# Patient Record
Sex: MALE | Race: WHITE | NOT HISPANIC OR LATINO | URBAN - METROPOLITAN AREA
[De-identification: names, ages, dates, MRNs, and addresses within clinical notes are randomized per-mention and may not be internally consistent; named-entity substitution may affect disease eponyms.]

---

## 2017-01-04 ENCOUNTER — OFFICE (AMBULATORY)
Dept: URBAN - METROPOLITAN AREA CLINIC 64 | Facility: CLINIC | Age: 67
End: 2017-01-04
Payer: MEDICARE

## 2017-01-04 VITALS
SYSTOLIC BLOOD PRESSURE: 139 MMHG | HEIGHT: 74 IN | DIASTOLIC BLOOD PRESSURE: 72 MMHG | HEART RATE: 80 BPM | WEIGHT: 276 LBS

## 2017-01-04 DIAGNOSIS — K21.9 GASTRO-ESOPHAGEAL REFLUX DISEASE WITHOUT ESOPHAGITIS: ICD-10-CM

## 2017-01-04 DIAGNOSIS — R13.10 DYSPHAGIA, UNSPECIFIED: ICD-10-CM

## 2017-01-04 PROCEDURE — 99213 OFFICE O/P EST LOW 20 MIN: CPT

## 2017-01-24 ENCOUNTER — ON CAMPUS - OUTPATIENT (AMBULATORY)
Dept: URBAN - METROPOLITAN AREA HOSPITAL 2 | Facility: HOSPITAL | Age: 67
End: 2017-01-24
Payer: MEDICARE

## 2017-01-24 VITALS
HEIGHT: 74 IN | DIASTOLIC BLOOD PRESSURE: 83 MMHG | RESPIRATION RATE: 17 BRPM | RESPIRATION RATE: 18 BRPM | SYSTOLIC BLOOD PRESSURE: 143 MMHG | SYSTOLIC BLOOD PRESSURE: 136 MMHG | HEART RATE: 85 BPM | DIASTOLIC BLOOD PRESSURE: 72 MMHG | SYSTOLIC BLOOD PRESSURE: 145 MMHG | HEART RATE: 68 BPM | HEART RATE: 78 BPM | WEIGHT: 260 LBS | OXYGEN SATURATION: 99 % | OXYGEN SATURATION: 97 % | RESPIRATION RATE: 16 BRPM | SYSTOLIC BLOOD PRESSURE: 133 MMHG | HEART RATE: 74 BPM | TEMPERATURE: 97.4 F | DIASTOLIC BLOOD PRESSURE: 82 MMHG | HEART RATE: 79 BPM | SYSTOLIC BLOOD PRESSURE: 138 MMHG | DIASTOLIC BLOOD PRESSURE: 103 MMHG

## 2017-01-24 DIAGNOSIS — K44.9 DIAPHRAGMATIC HERNIA WITHOUT OBSTRUCTION OR GANGRENE: ICD-10-CM

## 2017-01-24 DIAGNOSIS — K22.2 ESOPHAGEAL OBSTRUCTION: ICD-10-CM

## 2017-01-24 DIAGNOSIS — R13.10 DYSPHAGIA, UNSPECIFIED: ICD-10-CM

## 2017-01-24 PROCEDURE — 43249 ESOPH EGD DILATION <30 MM: CPT

## 2017-01-24 RX ADMIN — PROPOFOL: 10 INJECTION, EMULSION INTRAVENOUS at 09:01

## 2019-12-04 RX ORDER — AMLODIPINE BESYLATE 5 MG/1
TABLET ORAL
Qty: 180 TABLET | Refills: 0 | Status: SHIPPED | OUTPATIENT
Start: 2019-12-04 | End: 2020-03-06

## 2020-03-06 RX ORDER — AMLODIPINE BESYLATE 5 MG/1
TABLET ORAL
Qty: 180 TABLET | Refills: 0 | Status: SHIPPED | OUTPATIENT
Start: 2020-03-06 | End: 2020-06-01

## 2020-06-01 RX ORDER — AMLODIPINE BESYLATE 5 MG/1
TABLET ORAL
Qty: 180 TABLET | Refills: 0 | Status: SHIPPED | OUTPATIENT
Start: 2020-06-01 | End: 2020-08-31 | Stop reason: SDUPTHER

## 2020-08-31 RX ORDER — AMLODIPINE BESYLATE 5 MG/1
TABLET ORAL
Qty: 180 TABLET | Refills: 0 | Status: SHIPPED | OUTPATIENT
Start: 2020-08-31 | End: 2020-10-07

## 2020-09-06 ENCOUNTER — ANESTHESIA EVENT (OUTPATIENT)
Dept: PERIOP | Facility: HOSPITAL | Age: 70
End: 2020-09-06

## 2020-09-06 ENCOUNTER — HOSPITAL ENCOUNTER (INPATIENT)
Facility: HOSPITAL | Age: 70
LOS: 3 days | Discharge: HOME | End: 2020-09-09
Attending: EMERGENCY MEDICINE | Admitting: FAMILY MEDICINE

## 2020-09-06 ENCOUNTER — ANESTHESIA (OUTPATIENT)
Dept: PERIOP | Facility: HOSPITAL | Age: 70
End: 2020-09-06

## 2020-09-06 ENCOUNTER — APPOINTMENT (OUTPATIENT)
Dept: CT IMAGING | Facility: HOSPITAL | Age: 70
End: 2020-09-06

## 2020-09-06 DIAGNOSIS — I47.1 SVT (SUPRAVENTRICULAR TACHYCARDIA) (HCC): ICD-10-CM

## 2020-09-06 DIAGNOSIS — K43.9 VENTRAL HERNIA WITHOUT OBSTRUCTION OR GANGRENE: Primary | ICD-10-CM

## 2020-09-06 PROBLEM — K43.6 VENTRAL HERNIA WITH OBSTRUCTION AND WITHOUT GANGRENE: Status: ACTIVE | Noted: 2020-09-06

## 2020-09-06 LAB
ANION GAP SERPL CALCULATED.3IONS-SCNC: 11 MMOL/L (ref 5–15)
BASOPHILS # BLD AUTO: 0.1 10*3/MM3 (ref 0–0.2)
BASOPHILS NFR BLD AUTO: 0.6 % (ref 0–1.5)
BUN SERPL-MCNC: 26 MG/DL (ref 8–23)
BUN SERPL-MCNC: ABNORMAL MG/DL
BUN/CREAT SERPL: ABNORMAL
CALCIUM SPEC-SCNC: 9.1 MG/DL (ref 8.6–10.5)
CHLORIDE SERPL-SCNC: 105 MMOL/L (ref 98–107)
CO2 SERPL-SCNC: 25 MMOL/L (ref 22–29)
CREAT SERPL-MCNC: 1.16 MG/DL (ref 0.76–1.27)
DEPRECATED RDW RBC AUTO: 42 FL (ref 37–54)
EOSINOPHIL # BLD AUTO: 0 10*3/MM3 (ref 0–0.4)
EOSINOPHIL NFR BLD AUTO: 0.1 % (ref 0.3–6.2)
ERYTHROCYTE [DISTWIDTH] IN BLOOD BY AUTOMATED COUNT: 13 % (ref 12.3–15.4)
GFR SERPL CREATININE-BSD FRML MDRD: 62 ML/MIN/1.73
GLUCOSE SERPL-MCNC: 156 MG/DL (ref 65–99)
HBA1C MFR BLD: 5.3 % (ref 3.5–5.6)
HCT VFR BLD AUTO: 45.2 % (ref 37.5–51)
HGB BLD-MCNC: 15.8 G/DL (ref 13–17.7)
INR PPP: 0.94 (ref 0.93–1.1)
LYMPHOCYTES # BLD AUTO: 0.6 10*3/MM3 (ref 0.7–3.1)
LYMPHOCYTES NFR BLD AUTO: 4.8 % (ref 19.6–45.3)
MCH RBC QN AUTO: 32.4 PG (ref 26.6–33)
MCHC RBC AUTO-ENTMCNC: 35 G/DL (ref 31.5–35.7)
MCV RBC AUTO: 92.3 FL (ref 79–97)
MONOCYTES # BLD AUTO: 0.4 10*3/MM3 (ref 0.1–0.9)
MONOCYTES NFR BLD AUTO: 3.5 % (ref 5–12)
NEUTROPHILS NFR BLD AUTO: 11.1 10*3/MM3 (ref 1.7–7)
NEUTROPHILS NFR BLD AUTO: 91 % (ref 42.7–76)
NRBC BLD AUTO-RTO: 0.1 /100 WBC (ref 0–0.2)
PLATELET # BLD AUTO: 320 10*3/MM3 (ref 140–450)
PMV BLD AUTO: 7.3 FL (ref 6–12)
POTASSIUM SERPL-SCNC: 4.2 MMOL/L (ref 3.5–5.2)
PROTHROMBIN TIME: 10.3 SECONDS (ref 9.6–11.7)
RBC # BLD AUTO: 4.89 10*6/MM3 (ref 4.14–5.8)
SARS-COV-2 RNA PNL SPEC NAA+PROBE: NOT DETECTED
SODIUM SERPL-SCNC: 141 MMOL/L (ref 136–145)
WBC # BLD AUTO: 12.2 10*3/MM3 (ref 3.4–10.8)

## 2020-09-06 PROCEDURE — 93005 ELECTROCARDIOGRAM TRACING: CPT | Performed by: FAMILY MEDICINE

## 2020-09-06 PROCEDURE — C1781 MESH (IMPLANTABLE): HCPCS | Performed by: SURGERY

## 2020-09-06 PROCEDURE — 25010000002 ONDANSETRON PER 1 MG: Performed by: ANESTHESIOLOGY

## 2020-09-06 PROCEDURE — 25010000002 MORPHINE PER 10 MG: Performed by: EMERGENCY MEDICINE

## 2020-09-06 PROCEDURE — 85025 COMPLETE CBC W/AUTO DIFF WBC: CPT | Performed by: EMERGENCY MEDICINE

## 2020-09-06 PROCEDURE — 74176 CT ABD & PELVIS W/O CONTRAST: CPT

## 2020-09-06 PROCEDURE — 25010000003 CEFAZOLIN PER 500 MG: Performed by: SURGERY

## 2020-09-06 PROCEDURE — 83036 HEMOGLOBIN GLYCOSYLATED A1C: CPT | Performed by: EMERGENCY MEDICINE

## 2020-09-06 PROCEDURE — 25010000002 ONDANSETRON PER 1 MG: Performed by: EMERGENCY MEDICINE

## 2020-09-06 PROCEDURE — 93005 ELECTROCARDIOGRAM TRACING: CPT | Performed by: EMERGENCY MEDICINE

## 2020-09-06 PROCEDURE — 80048 BASIC METABOLIC PNL TOTAL CA: CPT | Performed by: EMERGENCY MEDICINE

## 2020-09-06 PROCEDURE — 49568 PR IMPLANT MESH HERNIA REPAIR/DEBRIDEMENT CLOSURE: CPT | Performed by: SURGERY

## 2020-09-06 PROCEDURE — 49561 PR REPAIR INCISIONAL HERNIA,STRANG: CPT | Performed by: SURGERY

## 2020-09-06 PROCEDURE — 0WUF0JZ SUPPLEMENT ABDOMINAL WALL WITH SYNTHETIC SUBSTITUTE, OPEN APPROACH: ICD-10-PCS | Performed by: SURGERY

## 2020-09-06 PROCEDURE — 25010000002 PROPOFOL 200 MG/20ML EMULSION: Performed by: ANESTHESIOLOGY

## 2020-09-06 PROCEDURE — 99284 EMERGENCY DEPT VISIT MOD MDM: CPT

## 2020-09-06 PROCEDURE — 87635 SARS-COV-2 COVID-19 AMP PRB: CPT | Performed by: EMERGENCY MEDICINE

## 2020-09-06 PROCEDURE — 25010000002 DEXAMETHASONE PER 1 MG: Performed by: ANESTHESIOLOGY

## 2020-09-06 PROCEDURE — 99221 1ST HOSP IP/OBS SF/LOW 40: CPT | Performed by: INTERNAL MEDICINE

## 2020-09-06 PROCEDURE — 25010000002 PHENYLEPHRINE 10 MG/ML SOLUTION: Performed by: ANESTHESIOLOGY

## 2020-09-06 PROCEDURE — 25010000002 FENTANYL CITRATE (PF) 250 MCG/5ML SOLUTION: Performed by: ANESTHESIOLOGY

## 2020-09-06 PROCEDURE — 25010000002 ONDANSETRON PER 1 MG: Performed by: FAMILY MEDICINE

## 2020-09-06 PROCEDURE — 99222 1ST HOSP IP/OBS MODERATE 55: CPT | Performed by: SURGERY

## 2020-09-06 PROCEDURE — 85610 PROTHROMBIN TIME: CPT | Performed by: EMERGENCY MEDICINE

## 2020-09-06 PROCEDURE — 25010000002 MIDAZOLAM PER 1 MG: Performed by: ANESTHESIOLOGY

## 2020-09-06 PROCEDURE — 25010000002 HYDROMORPHONE PER 4 MG: Performed by: ANESTHESIOLOGY

## 2020-09-06 DEVICE — VENTRIO ST HERNIA PATCH
Type: IMPLANTABLE DEVICE | Site: ABDOMEN | Status: FUNCTIONAL
Brand: VENTRIO ST HERNIA PATCH

## 2020-09-06 RX ORDER — AMLODIPINE BESYLATE 5 MG/1
5 TABLET ORAL
Status: DISCONTINUED | OUTPATIENT
Start: 2020-09-07 | End: 2020-09-09 | Stop reason: HOSPADM

## 2020-09-06 RX ORDER — HYDROMORPHONE HCL 110MG/55ML
0.5 PATIENT CONTROLLED ANALGESIA SYRINGE INTRAVENOUS
Status: DISCONTINUED | OUTPATIENT
Start: 2020-09-06 | End: 2020-09-06 | Stop reason: HOSPADM

## 2020-09-06 RX ORDER — ONDANSETRON 2 MG/ML
INJECTION INTRAMUSCULAR; INTRAVENOUS AS NEEDED
Status: DISCONTINUED | OUTPATIENT
Start: 2020-09-06 | End: 2020-09-06 | Stop reason: SURG

## 2020-09-06 RX ORDER — FENTANYL CITRATE 50 UG/ML
INJECTION, SOLUTION INTRAMUSCULAR; INTRAVENOUS AS NEEDED
Status: DISCONTINUED | OUTPATIENT
Start: 2020-09-06 | End: 2020-09-06 | Stop reason: SURG

## 2020-09-06 RX ORDER — ONDANSETRON 2 MG/ML
4 INJECTION INTRAMUSCULAR; INTRAVENOUS ONCE
Status: COMPLETED | OUTPATIENT
Start: 2020-09-06 | End: 2020-09-06

## 2020-09-06 RX ORDER — TAMSULOSIN HYDROCHLORIDE 0.4 MG/1
1 CAPSULE ORAL 2 TIMES DAILY
COMMUNITY

## 2020-09-06 RX ORDER — MORPHINE SULFATE 4 MG/ML
2 INJECTION, SOLUTION INTRAMUSCULAR; INTRAVENOUS ONCE
Status: COMPLETED | OUTPATIENT
Start: 2020-09-06 | End: 2020-09-06

## 2020-09-06 RX ORDER — ASPIRIN 81 MG/1
81 TABLET, CHEWABLE ORAL DAILY
COMMUNITY
End: 2020-09-09 | Stop reason: HOSPADM

## 2020-09-06 RX ORDER — ACETAMINOPHEN 325 MG/1
650 TABLET ORAL EVERY 4 HOURS PRN
Status: DISCONTINUED | OUTPATIENT
Start: 2020-09-06 | End: 2020-09-09 | Stop reason: HOSPADM

## 2020-09-06 RX ORDER — POTASSIUM CHLORIDE 20 MEQ/1
40 TABLET, EXTENDED RELEASE ORAL AS NEEDED
Status: DISCONTINUED | OUTPATIENT
Start: 2020-09-06 | End: 2020-09-09 | Stop reason: HOSPADM

## 2020-09-06 RX ORDER — EPHEDRINE SULFATE 50 MG/ML
INJECTION INTRAVENOUS AS NEEDED
Status: DISCONTINUED | OUTPATIENT
Start: 2020-09-06 | End: 2020-09-06 | Stop reason: SURG

## 2020-09-06 RX ORDER — POTASSIUM CHLORIDE 7.45 MG/ML
10 INJECTION INTRAVENOUS
Status: DISCONTINUED | OUTPATIENT
Start: 2020-09-06 | End: 2020-09-09 | Stop reason: HOSPADM

## 2020-09-06 RX ORDER — LIDOCAINE HYDROCHLORIDE 20 MG/ML
INJECTION, SOLUTION EPIDURAL; INFILTRATION; INTRACAUDAL; PERINEURAL AS NEEDED
Status: DISCONTINUED | OUTPATIENT
Start: 2020-09-06 | End: 2020-09-06 | Stop reason: SURG

## 2020-09-06 RX ORDER — CHLORAL HYDRATE 500 MG
1000 CAPSULE ORAL
COMMUNITY

## 2020-09-06 RX ORDER — LOSARTAN POTASSIUM 50 MG/1
100 TABLET ORAL DAILY
Status: DISCONTINUED | OUTPATIENT
Start: 2020-09-07 | End: 2020-09-09 | Stop reason: HOSPADM

## 2020-09-06 RX ORDER — ONDANSETRON 4 MG/1
4 TABLET, FILM COATED ORAL EVERY 6 HOURS PRN
Status: DISCONTINUED | OUTPATIENT
Start: 2020-09-06 | End: 2020-09-09 | Stop reason: HOSPADM

## 2020-09-06 RX ORDER — SULINDAC 150 MG/1
150 TABLET ORAL 2 TIMES DAILY
COMMUNITY
End: 2020-09-09 | Stop reason: HOSPADM

## 2020-09-06 RX ORDER — THIAMINE HCL 100 MG
100 TABLET ORAL DAILY
Status: DISCONTINUED | OUTPATIENT
Start: 2020-09-07 | End: 2020-09-09 | Stop reason: HOSPADM

## 2020-09-06 RX ORDER — DILTIAZEM HYDROCHLORIDE 5 MG/ML
20 INJECTION INTRAVENOUS ONCE
Status: COMPLETED | OUTPATIENT
Start: 2020-09-06 | End: 2020-09-06

## 2020-09-06 RX ORDER — PHENYLEPHRINE HYDROCHLORIDE 10 MG/ML
INJECTION INTRAVENOUS AS NEEDED
Status: DISCONTINUED | OUTPATIENT
Start: 2020-09-06 | End: 2020-09-06 | Stop reason: SURG

## 2020-09-06 RX ORDER — ACETAMINOPHEN 160 MG/5ML
650 SOLUTION ORAL EVERY 4 HOURS PRN
Status: DISCONTINUED | OUTPATIENT
Start: 2020-09-06 | End: 2020-09-09 | Stop reason: HOSPADM

## 2020-09-06 RX ORDER — NEOSTIGMINE METHYLSULFATE 5 MG/5 ML
SYRINGE (ML) INTRAVENOUS AS NEEDED
Status: DISCONTINUED | OUTPATIENT
Start: 2020-09-06 | End: 2020-09-06 | Stop reason: SURG

## 2020-09-06 RX ORDER — MAGNESIUM SULFATE HEPTAHYDRATE 40 MG/ML
2 INJECTION, SOLUTION INTRAVENOUS AS NEEDED
Status: DISCONTINUED | OUTPATIENT
Start: 2020-09-06 | End: 2020-09-09 | Stop reason: HOSPADM

## 2020-09-06 RX ORDER — ACETAMINOPHEN 650 MG/1
650 SUPPOSITORY RECTAL EVERY 4 HOURS PRN
Status: DISCONTINUED | OUTPATIENT
Start: 2020-09-06 | End: 2020-09-09 | Stop reason: HOSPADM

## 2020-09-06 RX ORDER — BISACODYL 10 MG
10 SUPPOSITORY, RECTAL RECTAL DAILY PRN
Status: DISCONTINUED | OUTPATIENT
Start: 2020-09-06 | End: 2020-09-09 | Stop reason: HOSPADM

## 2020-09-06 RX ORDER — DEXAMETHASONE SODIUM PHOSPHATE 4 MG/ML
INJECTION, SOLUTION INTRA-ARTICULAR; INTRALESIONAL; INTRAMUSCULAR; INTRAVENOUS; SOFT TISSUE AS NEEDED
Status: DISCONTINUED | OUTPATIENT
Start: 2020-09-06 | End: 2020-09-06 | Stop reason: SURG

## 2020-09-06 RX ORDER — SODIUM CHLORIDE 0.9 % (FLUSH) 0.9 %
10 SYRINGE (ML) INJECTION AS NEEDED
Status: DISCONTINUED | OUTPATIENT
Start: 2020-09-06 | End: 2020-09-09 | Stop reason: HOSPADM

## 2020-09-06 RX ORDER — ONDANSETRON 2 MG/ML
4 INJECTION INTRAMUSCULAR; INTRAVENOUS ONCE AS NEEDED
Status: DISCONTINUED | OUTPATIENT
Start: 2020-09-06 | End: 2020-09-06 | Stop reason: HOSPADM

## 2020-09-06 RX ORDER — FENTANYL CITRATE 50 UG/ML
75 INJECTION, SOLUTION INTRAMUSCULAR; INTRAVENOUS
Status: DISCONTINUED | OUTPATIENT
Start: 2020-09-06 | End: 2020-09-06 | Stop reason: HOSPADM

## 2020-09-06 RX ORDER — MIDAZOLAM HYDROCHLORIDE 1 MG/ML
INJECTION INTRAMUSCULAR; INTRAVENOUS AS NEEDED
Status: DISCONTINUED | OUTPATIENT
Start: 2020-09-06 | End: 2020-09-06 | Stop reason: SURG

## 2020-09-06 RX ORDER — TAMSULOSIN HYDROCHLORIDE 0.4 MG/1
0.4 CAPSULE ORAL DAILY
Status: DISCONTINUED | OUTPATIENT
Start: 2020-09-07 | End: 2020-09-09 | Stop reason: HOSPADM

## 2020-09-06 RX ORDER — NALOXONE HCL 0.4 MG/ML
0.4 VIAL (ML) INJECTION
Status: DISCONTINUED | OUTPATIENT
Start: 2020-09-06 | End: 2020-09-09 | Stop reason: HOSPADM

## 2020-09-06 RX ORDER — ONDANSETRON 2 MG/ML
4 INJECTION INTRAMUSCULAR; INTRAVENOUS EVERY 6 HOURS PRN
Status: DISCONTINUED | OUTPATIENT
Start: 2020-09-06 | End: 2020-09-06 | Stop reason: SDUPTHER

## 2020-09-06 RX ORDER — MORPHINE SULFATE 4 MG/ML
4 INJECTION, SOLUTION INTRAMUSCULAR; INTRAVENOUS
Status: DISCONTINUED | OUTPATIENT
Start: 2020-09-06 | End: 2020-09-09 | Stop reason: HOSPADM

## 2020-09-06 RX ORDER — CALCIUM ACETATE 667 MG/1
1334 CAPSULE ORAL 2 TIMES DAILY
COMMUNITY

## 2020-09-06 RX ORDER — THIAMINE MONONITRATE (VIT B1) 100 MG
100 TABLET ORAL DAILY
COMMUNITY

## 2020-09-06 RX ORDER — CALCIUM ACETATE 667 MG/1
1334 CAPSULE ORAL 2 TIMES DAILY
Status: DISCONTINUED | OUTPATIENT
Start: 2020-09-06 | End: 2020-09-09 | Stop reason: HOSPADM

## 2020-09-06 RX ORDER — SODIUM CHLORIDE 0.9 % (FLUSH) 0.9 %
10 SYRINGE (ML) INJECTION EVERY 12 HOURS SCHEDULED
Status: DISCONTINUED | OUTPATIENT
Start: 2020-09-06 | End: 2020-09-09 | Stop reason: HOSPADM

## 2020-09-06 RX ORDER — MAGNESIUM SULFATE HEPTAHYDRATE 40 MG/ML
4 INJECTION, SOLUTION INTRAVENOUS AS NEEDED
Status: DISCONTINUED | OUTPATIENT
Start: 2020-09-06 | End: 2020-09-09 | Stop reason: HOSPADM

## 2020-09-06 RX ORDER — OMEPRAZOLE 20 MG/1
20 CAPSULE, DELAYED RELEASE ORAL DAILY
COMMUNITY

## 2020-09-06 RX ORDER — POTASSIUM CHLORIDE 1.5 G/1.77G
40 POWDER, FOR SOLUTION ORAL AS NEEDED
Status: DISCONTINUED | OUTPATIENT
Start: 2020-09-06 | End: 2020-09-09 | Stop reason: HOSPADM

## 2020-09-06 RX ORDER — ROCURONIUM BROMIDE 10 MG/ML
INJECTION, SOLUTION INTRAVENOUS AS NEEDED
Status: DISCONTINUED | OUTPATIENT
Start: 2020-09-06 | End: 2020-09-06 | Stop reason: SURG

## 2020-09-06 RX ORDER — OMEGA-3S/DHA/EPA/FISH OIL/D3 300MG-1000
800 CAPSULE ORAL DAILY
COMMUNITY

## 2020-09-06 RX ORDER — CHOLECALCIFEROL (VITAMIN D3) 125 MCG
5 CAPSULE ORAL NIGHTLY PRN
Status: DISCONTINUED | OUTPATIENT
Start: 2020-09-06 | End: 2020-09-09 | Stop reason: HOSPADM

## 2020-09-06 RX ORDER — SODIUM CHLORIDE, SODIUM LACTATE, POTASSIUM CHLORIDE, CALCIUM CHLORIDE 600; 310; 30; 20 MG/100ML; MG/100ML; MG/100ML; MG/100ML
50 INJECTION, SOLUTION INTRAVENOUS CONTINUOUS
Status: DISCONTINUED | OUTPATIENT
Start: 2020-09-06 | End: 2020-09-09 | Stop reason: HOSPADM

## 2020-09-06 RX ORDER — PANTOPRAZOLE SODIUM 40 MG/1
40 TABLET, DELAYED RELEASE ORAL EVERY MORNING
Status: DISCONTINUED | OUTPATIENT
Start: 2020-09-07 | End: 2020-09-09 | Stop reason: HOSPADM

## 2020-09-06 RX ORDER — NITROGLYCERIN 0.4 MG/1
0.4 TABLET SUBLINGUAL
Status: DISCONTINUED | OUTPATIENT
Start: 2020-09-06 | End: 2020-09-09 | Stop reason: HOSPADM

## 2020-09-06 RX ORDER — HYDROCODONE BITARTRATE AND ACETAMINOPHEN 5; 325 MG/1; MG/1
1 TABLET ORAL EVERY 4 HOURS PRN
Status: DISCONTINUED | OUTPATIENT
Start: 2020-09-06 | End: 2020-09-09 | Stop reason: HOSPADM

## 2020-09-06 RX ORDER — MAGNESIUM HYDROXIDE 1200 MG/15ML
LIQUID ORAL AS NEEDED
Status: DISCONTINUED | OUTPATIENT
Start: 2020-09-06 | End: 2020-09-06 | Stop reason: HOSPADM

## 2020-09-06 RX ORDER — LOSARTAN POTASSIUM 100 MG/1
100 TABLET ORAL DAILY
COMMUNITY
End: 2020-10-07

## 2020-09-06 RX ORDER — PROPOFOL 10 MG/ML
INJECTION, EMULSION INTRAVENOUS AS NEEDED
Status: DISCONTINUED | OUTPATIENT
Start: 2020-09-06 | End: 2020-09-06 | Stop reason: SURG

## 2020-09-06 RX ORDER — ONDANSETRON 2 MG/ML
4 INJECTION INTRAMUSCULAR; INTRAVENOUS EVERY 6 HOURS PRN
Status: DISCONTINUED | OUTPATIENT
Start: 2020-09-06 | End: 2020-09-09 | Stop reason: HOSPADM

## 2020-09-06 RX ORDER — ONDANSETRON 4 MG/1
4 TABLET, FILM COATED ORAL EVERY 6 HOURS PRN
Status: DISCONTINUED | OUTPATIENT
Start: 2020-09-06 | End: 2020-09-06 | Stop reason: SDUPTHER

## 2020-09-06 RX ORDER — LOSARTAN POTASSIUM AND HYDROCHLOROTHIAZIDE 25; 100 MG/1; MG/1
1 TABLET ORAL DAILY
COMMUNITY
End: 2020-09-06

## 2020-09-06 RX ADMIN — HYDROCODONE BITARTRATE AND ACETAMINOPHEN 1 TABLET: 5; 325 TABLET ORAL at 22:34

## 2020-09-06 RX ADMIN — CEFAZOLIN 1 G: 1 INJECTION, POWDER, FOR SOLUTION INTRAMUSCULAR; INTRAVENOUS at 22:34

## 2020-09-06 RX ADMIN — ONDANSETRON 4 MG: 2 INJECTION INTRAMUSCULAR; INTRAVENOUS at 22:40

## 2020-09-06 RX ADMIN — SODIUM CHLORIDE, SODIUM LACTATE, POTASSIUM CHLORIDE, AND CALCIUM CHLORIDE 125 ML/HR: 600; 310; 30; 20 INJECTION, SOLUTION INTRAVENOUS at 11:03

## 2020-09-06 RX ADMIN — HYDROMORPHONE HYDROCHLORIDE 0.5 MG: 2 INJECTION, SOLUTION INTRAMUSCULAR; INTRAVENOUS; SUBCUTANEOUS at 16:29

## 2020-09-06 RX ADMIN — FENTANYL CITRATE 100 MCG: 50 INJECTION, SOLUTION INTRAMUSCULAR; INTRAVENOUS at 14:29

## 2020-09-06 RX ADMIN — SODIUM CHLORIDE, SODIUM LACTATE, POTASSIUM CHLORIDE, AND CALCIUM CHLORIDE 125 ML/HR: 600; 310; 30; 20 INJECTION, SOLUTION INTRAVENOUS at 22:34

## 2020-09-06 RX ADMIN — SODIUM CHLORIDE, SODIUM LACTATE, POTASSIUM CHLORIDE, AND CALCIUM CHLORIDE 125 ML/HR: 600; 310; 30; 20 INJECTION, SOLUTION INTRAVENOUS at 16:45

## 2020-09-06 RX ADMIN — Medication 4 MG: at 15:41

## 2020-09-06 RX ADMIN — EPHEDRINE SULFATE 10 MG: 50 INJECTION, SOLUTION INTRAVENOUS at 14:35

## 2020-09-06 RX ADMIN — METOPROLOL TARTRATE 12.5 MG: 25 TABLET, FILM COATED ORAL at 21:50

## 2020-09-06 RX ADMIN — HYDROMORPHONE HYDROCHLORIDE 0.5 MG: 2 INJECTION, SOLUTION INTRAMUSCULAR; INTRAVENOUS; SUBCUTANEOUS at 16:40

## 2020-09-06 RX ADMIN — HYDROMORPHONE HYDROCHLORIDE 0.5 MG: 2 INJECTION, SOLUTION INTRAMUSCULAR; INTRAVENOUS; SUBCUTANEOUS at 16:05

## 2020-09-06 RX ADMIN — ONDANSETRON 4 MG: 2 INJECTION INTRAMUSCULAR; INTRAVENOUS at 10:16

## 2020-09-06 RX ADMIN — PROPOFOL 100 MG: 10 INJECTION, EMULSION INTRAVENOUS at 14:30

## 2020-09-06 RX ADMIN — MIDAZOLAM 2 MG: 1 INJECTION INTRAMUSCULAR; INTRAVENOUS at 14:29

## 2020-09-06 RX ADMIN — CEFAZOLIN SODIUM 2 G: 1 INJECTION, POWDER, FOR SOLUTION INTRAMUSCULAR; INTRAVENOUS at 14:29

## 2020-09-06 RX ADMIN — ONDANSETRON 4 MG: 2 INJECTION INTRAMUSCULAR; INTRAVENOUS at 15:35

## 2020-09-06 RX ADMIN — Medication 10 ML: at 21:50

## 2020-09-06 RX ADMIN — PHENYLEPHRINE HYDROCHLORIDE 0.2 MG: 10 INJECTION INTRAVENOUS at 14:33

## 2020-09-06 RX ADMIN — EPHEDRINE SULFATE 10 MG: 50 INJECTION, SOLUTION INTRAVENOUS at 15:00

## 2020-09-06 RX ADMIN — CALCIUM ACETATE 1334 MG: 667 CAPSULE ORAL at 21:50

## 2020-09-06 RX ADMIN — PHENYLEPHRINE HYDROCHLORIDE 0.2 MG: 10 INJECTION INTRAVENOUS at 15:01

## 2020-09-06 RX ADMIN — DEXAMETHASONE SODIUM PHOSPHATE 4 MG: 4 INJECTION, SOLUTION INTRAMUSCULAR; INTRAVENOUS at 14:59

## 2020-09-06 RX ADMIN — HYDROMORPHONE HYDROCHLORIDE 0.5 MG: 2 INJECTION, SOLUTION INTRAMUSCULAR; INTRAVENOUS; SUBCUTANEOUS at 16:20

## 2020-09-06 RX ADMIN — FAMOTIDINE 20 MG: 10 INJECTION, SOLUTION INTRAVENOUS at 14:27

## 2020-09-06 RX ADMIN — DILTIAZEM HYDROCHLORIDE 20 MG: 5 INJECTION INTRAVENOUS at 11:15

## 2020-09-06 RX ADMIN — ROCURONIUM BROMIDE 50 MG: 10 INJECTION, SOLUTION INTRAVENOUS at 14:29

## 2020-09-06 RX ADMIN — MORPHINE SULFATE 2 MG: 4 INJECTION INTRAVENOUS at 10:16

## 2020-09-06 RX ADMIN — PROPOFOL 200 MG: 10 INJECTION, EMULSION INTRAVENOUS at 14:29

## 2020-09-06 RX ADMIN — LIDOCAINE HYDROCHLORIDE 100 MG: 20 INJECTION, SOLUTION EPIDURAL; INFILTRATION; INTRACAUDAL; PERINEURAL at 14:29

## 2020-09-06 RX ADMIN — GLYCOPYRROLATE 0.4 MG: 0.2 INJECTION, SOLUTION INTRAMUSCULAR; INTRAVITREAL at 15:41

## 2020-09-06 NOTE — ANESTHESIA POSTPROCEDURE EVALUATION
Patient: Florencio Rangel    Procedure Summary     Date:  09/06/20 Room / Location:  [facility] OR 08 / [facility] MAIN OR    Anesthesia Start:  1419 Anesthesia Stop:  1547    Procedure:  INCISIONAL HERNIA REPAIR (N/A Abdomen) Diagnosis:       Incisional hernia with obstruction      (ventral hernia with obstruction)    Surgeon:  Yo Montanez MD Provider:  Tammie Vidal MD    Anesthesia Type:  general ASA Status:  3          Anesthesia Type: general    Vitals  Vitals Value Taken Time   /74 9/6/2020  4:46 PM   Temp 97.5 °F (36.4 °C) 9/6/2020  3:48 PM   Pulse 96 9/6/2020  4:47 PM   Resp 20 9/6/2020  4:33 PM   SpO2 96 % 9/6/2020  4:47 PM   Vitals shown include unvalidated device data.        Post Anesthesia Care and Evaluation    Patient location during evaluation: PACU  Patient participation: complete - patient participated  Level of consciousness: awake  Pain scale: See nurse's notes for pain score.  Pain management: adequate  Airway patency: patent  Anesthetic complications: No anesthetic complications  PONV Status: none  Cardiovascular status: acceptable  Respiratory status: acceptable  Hydration status: acceptable    Comments: Patient seen and examined postoperatively; vital signs stable; SpO2 greater than or equal to 90%; cardiopulmonary status stable; nausea/vomiting adequately controlled; pain adequately controlled; no apparent anesthesia complications; patient discharged from anesthesia care when discharge criteria were met

## 2020-09-06 NOTE — CONSULTS
Cardiology Consult Note      REQUESTING PHYSICIAN    Isaiah Finnegan MD    PATIENT IDENTIFICATION  Name: Florencio Rangel  Age: 70 y.o.  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  He is a patient of Dr. Davis, initially evaluated 2015 with abnormal EKG.  Patient underwent stress testing at that time with good functional capacity with no evidence of ischemia.  He has history of hypertension and GERD.      CC:  Tachycardia    HISTORY OF PRESENT ILLNESS:   Patient presented to the emergency department for evaluation of ventral hernia which has increased in size.  Patient denies any actual pain, nausea or vomiting.  Patient states the hernia has increased in size over the past 24 hours.  EKG performed in the emergency department with narrow complex tachycardia, regular.  Patient was given 20 mg IV Cardizem and converted to normal sinus rhythm.  Patient has been evaluated by surgery with plans for ventral hernia repair today.  Upon my evaluation, patient has been moved to surgical floor.  He is resting comfortably in bed.  He denies any recent or current chest pain, pressure, tightness or palpitations.  He denies any shortness of breath.  No lower extremity edema, dizziness or lightheadedness.    REVIEW OF SYSTEMS:  Pertinent items are noted in HPI, all other systems reviewed and negative    OBJECTIVE  "      ASSESSMENT/PLAN    Ventral hernia without obstruction or gangrene  Supraventricular tachycardiaPresurgical cardiac risk assessment to undergo ventral hernia repair today    Recommendations  Prior stress testing in 2015- for inducible ischemia.  Likely AV node reentry tachycardia aborted with IV Cardizem bolus 9  Patient should be low risk for perioperative cardiac event to undergo hernia repair  Obtain echo following surgical procedure  We will follow  Further recommendations following evaluation by cardiologist        Vital Signs  Visit Vitals  /66   Pulse 93   Temp 98.4 °F (36.9 °C) (Oral)   Resp 16   Ht 188 cm (74\")   Wt 118 kg (260 lb)   SpO2 98%   BMI 33.38 kg/m²     Oxygen Therapy  SpO2: 98 %  Device (Oxygen Therapy): room air  Flowsheet Rows      First Filed Value   Admission Height  188 cm (74\") Documented at 09/06/2020 0827   Admission Weight  118 kg (260 lb) Documented at 09/06/2020 0827        Intake & Output (last 3 days)     None        Lines, Drains & Airways    Active LDAs     Name:   Placement date:   Placement time:   Site:   Days:    Peripheral IV 09/06/20 1008 Left Forearm   09/06/20    1008    Forearm   less than 1                MEDICAL HISTORY    History reviewed. No pertinent past medical history.     SURGICAL HISTORY    History reviewed. No pertinent surgical history.     FAMILY HISTORY    History reviewed. No pertinent family history.    SOCIAL HISTORY    Social History     Tobacco Use   • Smoking status: Former Smoker   Substance Use Topics   • Alcohol use: Yes     Alcohol/week: 2.0 standard drinks     Types: 2 Cans of beer per week        ALLERGIES    No Known Allergies           /66   Pulse 93   Temp 98.4 °F (36.9 °C) (Oral)   Resp 16   Ht 188 cm (74\")   Wt 118 kg (260 lb)   SpO2 98%   BMI 33.38 kg/m²   Intake/Output last 3 shifts:  No intake/output data recorded.  Intake/Output this shift:  No intake/output data recorded.    PHYSICAL EXAM:    General: Alert, " cooperative, no distress, appears stated age  Head:  Normocephalic, atraumatic, mucous membranes moist  Eyes:  Conjunctiva/corneas clear, EOM's intact     Neck:  Supple,  no adenopathy; no JVD or bruit  Lungs: Clear to auscultation bilaterally, no wheezes rhonchi rales are noted  Chest wall: No tenderness  Heart::  Regular rate and rhythm, S1 and S2 normal, no murmur, rub or gallop  Abdomen: Soft, bowel sounds active  Extremities: No cyanosis, clubbing, or edema groin soft no hematoma.  Pulses: 2+ and symmetric all extremities  Skin:  No rashes or lesions  Neuro/psych: A&O x3. CN II through XII are grossly intact with appropriate affect      Scheduled Meds:           Continuous Infusions:      lactated ringers 125 mL/hr Last Rate: 125 mL/hr (09/06/20 1103)       PRN Meds:    [COMPLETED] Insert peripheral IV **AND** sodium chloride        Results Review:     I reviewed the patient's new clinical results.    CBC    Results from last 7 days   Lab Units 09/06/20  1008   WBC 10*3/mm3 12.20*   HEMOGLOBIN g/dL 15.8   PLATELETS 10*3/mm3 320     Cr Clearance Estimated Creatinine Clearance: 78.6 mL/min (by C-G formula based on SCr of 1.16 mg/dL).  Coag   Results from last 7 days   Lab Units 09/06/20  1008   INR  0.94     HbA1C No results found for: HGBA1C  Blood Glucose No results found for: POCGLU  Infection     CMP Results from last 7 days   Lab Units 09/06/20  1008   SODIUM mmol/L 141   POTASSIUM mmol/L 4.2   CHLORIDE mmol/L 105   CO2 mmol/L 25.0   BUN  26*   CREATININE mg/dL 1.16   GLUCOSE mg/dL 156*     ABG      UA      ELENITA  No results found for: POCMETH, POCAMPHET, POCBARBITUR, POCBENZO, POCCOCAINE, POCOPIATES, POCOXYCODO, POCPHENCYC, POCPROPOXY, POCTHC, POCTRICYC  Lysis Labs Results from last 7 days   Lab Units 09/06/20  1008   INR  0.94   HEMOGLOBIN g/dL 15.8   PLATELETS 10*3/mm3 320   CREATININE mg/dL 1.16     Radiology(recent) Ct Abdomen Pelvis Without Contrast    Result Date: 9/6/2020   1. Ventral abdominal wall  hernia just above the umbilicus with a loop of small bowel prolapsed through the hernia defect and evidence of small bowel obstruction secondary to the hernia. 2. Cholelithiasis 3. Sigmoid diverticulosis without diverticulitis  Electronically Signed By-Florencio Sarabia On:9/6/2020 9:07 AM This report was finalized on [id number] by  Florencio Sarabia, .              Xrays, labs reviewed personally by physician.    ECG/EMG Results (most recent)     Procedure Component Value Units Date/Time    ECG 12 Lead [[id number]] Collected:  09/06/20 1107     Updated:  09/06/20 1110    Narrative:       HEART RATE= 145  bpm  RR Interval= 412  ms  CT Interval= 116  ms  P Horizontal Axis=   deg  P Front Axis= 0  deg  QRSD Interval= 106  ms  QT Interval= 336  ms  QRS Axis= 260  deg  T Wave Axis= -61  deg  - ABNORMAL ECG -  Sinus tachycardia  LAD, consider left anterior fascicular block  Repolarization abnormality, prob rate related  Prolonged QT interval  Electronically Signed By:   Date and Time of Study: 2020-09-06 11:07:45            Medication Review:   I have reviewed the patient's current medication list  Scheduled Meds:   Continuous Infusions:  lactated ringers 125 mL/hr Last Rate: 125 mL/hr (09/06/20 1103)     PRN Meds:.[COMPLETED] Insert peripheral IV **AND** sodium chloride    Imaging:  Imaging Results (Last 72 Hours)     Procedure Component Value Units Date/Time    CT Abdomen Pelvis Without Contrast [[id number]] Collected:  09/06/20 0903     Updated:  09/06/20 1137    Narrative:          DATE OF EXAM:  9/6/2020 8:54 AM     PROCEDURE:  CT ABDOMEN PELVIS WO CONTRAST-     INDICATIONS:  hernia     COMPARISON:  No Comparisons Available     TECHNIQUE:  Routine transaxial slices were obtained through the abdomen and pelvis  without the administration of intravenous contrast. Reconstructed  coronal and sagittal images were also obtained. Automated exposure  control and iterative construction methods were used.     FINDINGS:  There are old  "healed right-sided rib fractures with some pleural  thickening at the site and some linear scarring in the right base  multiple gallstones are present in the gallbladder. The liver has a  normal appearance. The spleen, adrenal glands, and pancreas are within  normal limits. There is a dense cortical calcification identified in the  upper pole of the left kidney, likely a stone with overlying renal  cortical thinning. This calcification measures 12 mm in diameter. There  is an adjacent renal cyst. There is no evidence of obstructive uropathy.  There is a ventral abdominal wall hernia just above the umbilicus. The  defect in the anterior abdominal wall measures 5 cm in diameter. There  is a small bowel loop prolapsed through the defect with evidence of  small bowel obstruction. Distal to this loop of bowel, the small bowel  is decompressed as is the colon. There is sigmoid diverticulosis. The  bladder appears normal. The appendix is absent.       Impression:          1. Ventral abdominal wall hernia just above the umbilicus with a loop of  small bowel prolapsed through the hernia defect and evidence of small  bowel obstruction secondary to the hernia.  2. Cholelithiasis  3. Sigmoid diverticulosis without diverticulitis     Electronically Signed By-Florencio Sarabia On:9/6/2020 9:07 AM  This report was finalized on [id number] by  Florencio Sarabia, .            ABDOULAYE Mathews  09/06/20  12:17       EMR Dragon/Transcription:   \"Dictated utilizing Dragon dictation\".             Electronically signed by ABDOULAYE Mathews, 09/06/20, 12:19 PM.          "

## 2020-09-06 NOTE — ANESTHESIA PREPROCEDURE EVALUATION
Anesthesia Evaluation     Patient summary reviewed and Nursing notes reviewed   NPO Solid Status: > 8 hours  NPO Liquid Status: > 8 hours           Airway   Mallampati: III  Possible difficult intubation and Large neck circumference  Comment: Short chin  Dental - normal exam     Pulmonary - negative pulmonary ROS   Cardiovascular     (+) hyperlipidemia,       Neuro/Psych- negative ROS  GI/Hepatic/Renal/Endo    (+)  GERD,      Musculoskeletal     Abdominal    Substance History      OB/GYN          Other   arthritis,    history of cancer                    Anesthesia Plan    ASA 3     general     intravenous induction     Anesthetic plan, all risks, benefits, and alternatives have been provided, discussed and informed consent has been obtained with: patient.

## 2020-09-06 NOTE — ANESTHESIA PROCEDURE NOTES
Airway  Urgency: elective    Date/Time: 9/6/2020 2:31 PM  End Time:9/6/2020 2:33 PM    General Information and Staff    Patient location during procedure: OR  Anesthesiologist: Tammie Vidal MD    Indications and Patient Condition  Indications for airway management: airway protection    Preoxygenated: yes  Mask difficulty assessment: 2 - vent by mask + OA or adjuvant +/- NMBA    Final Airway Details  Final airway type: endotracheal airway      Successful airway: ETT  Cuffed: yes   Successful intubation technique: direct laryngoscopy and video laryngoscopy  Facilitating devices/methods: intubating stylet  Endotracheal tube insertion site: oral  Blade: Andreina  Blade size: 4  ETT size (mm): 7.5  Cormack-Lehane Classification: grade IIa - partial view of glottis  Placement verified by: chest auscultation and capnometry   Measured from: lips  Number of attempts at approach: 2  Assessment: lips, teeth, and gum same as pre-op and atraumatic intubation

## 2020-09-06 NOTE — ED NOTES
Pt reports being diagnosed with abdominal hernia aprox four years ago. Pt reports pain in his abdomen and lightheadedness intermittently over the past year, today around 0130, pt reports abdominal cramping and his hernias were sticking out more than normal. Pt reports several bm this morning.        Arleen Flynn, RN  09/06/20 6884

## 2020-09-06 NOTE — ED NOTES
Patient hooked up to telemetry at this time, also placed call light and urinal at bedside.      Bryanna Mg  09/06/20 0836

## 2020-09-06 NOTE — OP NOTE
Operative Report:    Patient Name:  Florencio Rangel  YOB: 1950    Date of Surgery:  9/6/2020     Indications: 70-year-old man presented emergency department with a chief complaint of worsening ventral hernia symptoms.  On imaging he was found to have a bilobulated ventral hernia with evidence of small bowel dilation and transition point at the hernia as the bowel ran to the abdomen.  The hernia was not reducible by palpation.  I counseled him about the risks and benefits of moving forward with repair of this hernia he understood and wished to proceed.    Pre-op Diagnosis:   ventral hernia with obstruction, incarcerated       Post-Op Diagnosis Codes:     * Incisional hernia with obstruction, incarcerated [K43.0]    Procedure/CPT® Codes:      Procedure(s):  INCISIONAL HERNIA REPAIR  Subcutaneous skin flaps approximately 5 cm for mesh placement and abdominal wall closure  Staff:  Surgeon(s):  Yo Montanez MD         Anesthesia: General    Estimated Blood Loss: 100ml    Implants:    Implant Name Type Inv. Item Serial No.  Lot No. LRB No. Used   [employer] CASH VENTRIO ST OVL 4.3X5.5IN MD - [id number] Implant [employer] CASH VENTRIO ST OVL 4.3X5.5IN MD [employer]EL  (DIV OF [employer]) [id number] N/A 1       Specimen:          None        Findings: Multiple hernias along the incision.  The total hernia dimensions are about 8 cm craniocaudal 4 cm transverse.  An 11 x 14 cm ventrio ST hernia mesh was placed.     Complications: None    Description of Procedure: Patient was taken to the operating placed on the operating table in the supine position under general anesthesia.  His abdomen was prepped and draped normal sterile fashion.  Timeout was performed and find the correct patient procedure site of operation.  I began the operation by making a midline skin incision through his previous incision site.  I then began to dissect around the hernia sac circumferentially using the Bovie.  The hernia sac was clearly  dissected free and the fascial edges were cleared.  The bowel reduced into the abdomen during the dissection.  There were multiple hernia sites along the midline incision.  I freed the posterior aspect of the abdominal wall from omental adhesions.  I then connected the superior and inferior hernia with the Bovie to create 1 large hernia defect.  It measured approximately 8 cm craniocaudal dimension by 4 cm transverse.  I then raised skin flaps approximately 5 cm circumferentially to allow for closure.  This was at the level of the anterior fascia.  I then selected an 11 x 14 cm ventral EO ST hernia mesh.  It was secured superiorly and inferiorly using interrupted PDS suture.  I then circumferentially tacked the mesh to the abdominal wall using a Sorber fix tacker.  The midline fascia was then closed using interrupted 0 PDS in a figure-of-eight fashion.  The subcutaneous space was then thoroughly irrigated hemostasis was achieved.  The dermis and subcutaneous space was closed with interrupted 3-0 Vicryl suture.  The skin was closed with 4-0 Vicryl suture.      Yo Montanez MD     Date: 9/6/2020  Time: 15:48    This note was created using Dragon Voice Recognition software.

## 2020-09-06 NOTE — ED PROVIDER NOTES
Subjective   History of Present Illness    Context: 70-year-old male presents with complaint of hernia.  He states he has had it for over a year.  He noted it is popped out become larger today.  He has had no significant pain.  No nausea vomiting.  Bowels have been functioning.  He has had no fever.  Location: Abdomen  Quality: Swelling  Duration: Has been present for over a year but suddenly became larger in the last day  Timing: Constant  Severity:   Modifying factors: Previous open appendectomy  Associated signs and symptoms:    Review of Systems   Constitutional: Negative for fever and unexpected weight change.   HENT: Negative for congestion and sore throat.    Eyes: Negative for pain.   Respiratory: Negative for cough and shortness of breath.    Cardiovascular: Negative for chest pain and leg swelling.   Gastrointestinal: Positive for abdominal distention and abdominal pain. Negative for diarrhea and vomiting.   Genitourinary: Negative for dysuria and urgency.   Musculoskeletal: Negative for back pain.   Skin: Negative for rash.   Neurological: Positive for dizziness. Negative for weakness and headaches.   Psychiatric/Behavioral: Negative for confusion.        History reviewed. No pertinent past medical history.  Hypertension, appendectomy,  No Known Allergies    History reviewed. No pertinent surgical history.    History reviewed. No pertinent family history.    Social History     Socioeconomic History   • Marital status:      Spouse name: Not on file   • Number of children: Not on file   • Years of education: Not on file   • Highest education level: Not on file   Tobacco Use   • Smoking status: Former Smoker   Substance and Sexual Activity   • Alcohol use: Yes     Alcohol/week: 2.0 standard drinks     Types: 2 Cans of beer per week   • Drug use: Never   • Sexual activity: Defer     Prior to Admission medications    Medication Sig Start Date End Date Taking? Authorizing Provider   aspirin 81 MG  chewable tablet Chew 81 mg Daily.   Yes Yudelka Pulliam MD   calcium acetate (PHOS BINDER,) 667 MG capsule capsule Take 1,334 mg by mouth 3 (Three) Times a Day.   Yes Yudelka Pulliam MD   cholecalciferol (VITAMIN D3) 10 MCG (400 UNIT) tablet Take 800 Units by mouth Daily.   Yes Yudelka Pulliam MD   losartan-hydrochlorothiazide (HYZAAR) 100-25 MG per tablet Take 1 tablet by mouth Daily.   Yes Yudelka Pulliam MD   Omega-3 Fatty Acids (FISH OIL) 1000 MG capsule capsule Take  by mouth Daily With Breakfast.   Yes Yudelka Pulliam MD   omeprazole (priLOSEC) 20 MG capsule Take 20 mg by mouth Daily.   Yes Yudelka Pulliam MD   sulindac (CLINORIL) 150 MG tablet Take 150 mg by mouth 2 (Two) Times a Day.   Yes Yudelka Pulliam MD   tamsulosin (FLOMAX) 0.4 MG capsule 24 hr capsule Take 1 capsule by mouth Daily.   Yes Yudelka Pulliam MD   thiamine (VITAMIN B-1) 100 MG tablet Take 100 mg by mouth Daily.   Yes Yudelka Pulliam MD   amLODIPine (NORVASC) 5 MG tablet Take 1 tablet by mouth twice daily 8/31/20   Nick Chapman MD           Objective   Physical Exam  70-year-old male awake alert.  Generally well-developed well-nourished.  Pupils equal round and light.  Oropharynx clear mucous membranes moist neck supple chest clear cardiovascular rate and rhythm examination of abdomen reveals midline incision.  There are noted to be hernias to both right and left of incision.  Procedures           ED Course      Results for orders placed or performed during the hospital encounter of 09/06/20   Basic Metabolic Panel   Result Value Ref Range    Glucose 156 (H) 65 - 99 mg/dL    BUN      Creatinine 1.16 0.76 - 1.27 mg/dL    Sodium 141 136 - 145 mmol/L    Potassium 4.2 3.5 - 5.2 mmol/L    Chloride 105 98 - 107 mmol/L    CO2 25.0 22.0 - 29.0 mmol/L    Calcium 9.1 8.6 - 10.5 mg/dL    eGFR Non African Amer 62 >60 mL/min/1.73    BUN/Creatinine Ratio      Anion Gap 11.0 5.0 - 15.0 mmol/L  "  Protime-INR   Result Value Ref Range    Protime 10.3 9.6 - 11.7 Seconds    INR 0.94 0.93 - 1.10   CBC Auto Differential   Result Value Ref Range    WBC 12.20 (H) 3.40 - 10.80 10*3/mm3    RBC 4.89 4.14 - 5.80 10*6/mm3    Hemoglobin 15.8 13.0 - 17.7 g/dL    Hematocrit 45.2 37.5 - 51.0 %    MCV 92.3 79.0 - 97.0 fL    MCH 32.4 26.6 - 33.0 pg    MCHC 35.0 31.5 - 35.7 g/dL    RDW 13.0 12.3 - 15.4 %    RDW-SD 42.0 37.0 - 54.0 fl    MPV 7.3 6.0 - 12.0 fL    Platelets 320 140 - 450 10*3/mm3    Neutrophil % 91.0 (H) 42.7 - 76.0 %    Lymphocyte % 4.8 (L) 19.6 - 45.3 %    Monocyte % 3.5 (L) 5.0 - 12.0 %    Eosinophil % 0.1 (L) 0.3 - 6.2 %    Basophil % 0.6 0.0 - 1.5 %    Neutrophils, Absolute 11.10 (H) 1.70 - 7.00 10*3/mm3    Lymphocytes, Absolute 0.60 (L) 0.70 - 3.10 10*3/mm3    Monocytes, Absolute 0.40 0.10 - 0.90 10*3/mm3    Eosinophils, Absolute 0.00 0.00 - 0.40 10*3/mm3    Basophils, Absolute 0.10 0.00 - 0.20 10*3/mm3    nRBC 0.1 0.0 - 0.2 /100 WBC   BUN   Result Value Ref Range    BUN 26 (H) 8 - 23 mg/dL     No radiology results for the last day  Medications   sodium chloride 0.9 % flush 10 mL (has no administration in time range)   lactated ringers infusion (125 mL/hr Intravenous New Bag 9/6/20 1103)   Morphine sulfate (PF) injection 2 mg (2 mg Intravenous Given 9/6/20 1016)   ondansetron (ZOFRAN) injection 4 mg (4 mg Intravenous Given 9/6/20 1016)   dilTIAZem (CARDIZEM) injection 20 mg (20 mg Intravenous Given 9/6/20 1115)     /89   Pulse (!) 148   Temp 98.4 °F (36.9 °C) (Oral)   Resp 16   Ht 188 cm (74\")   Wt 118 kg (260 lb)   SpO2 95%   BMI 33.38 kg/m²                                        MDM  Chart review: Open appendectomy 2008 for a perforated appendix  Comorbidity: As per past history  Differential: Hernia, incarcerated, strangulated  My EKG interpretation: EKG #1 supraventricular tachycardia appears to be atrial flutter with 2-1 AV conduction  EKG #2 sinus rhythm small inferior Q waves.  Lab: " Basic metabolic panel BUN 26 creatinine 1.16, glucose 156, white count 12.2 with hemoglobin 13.8 platelet count of 320 91 segs no bands INR 0.94  Radiology: Reviewed CT scan of abdomen pelvis.  There is ventral hernia with loop of small bowel.  There appears to be some mild obstructive changes.  Discussion/treatment: Patient IV placed.  He was given morphine Zofran.  Attempted reduction of hernia was unsuccessful.  He was discussed with Dr. Montanez who saw patient in consultation.  After consultation he is planning on operative repair today.  Patient was discussed with Dr. Alfredo will be admitted to their service.  Patient reports no history of diabetes with elevated glucose will check hemoglobin A1c.  Patient was started on IV fluids.  While emergency department patient was noted to become tachycardic.  EKG was obtained remarkable for SVT.  He was given Cardizem 20 mg IV with positive conversion to sinus rhythm.  Dr. Alfredo was advised of the events.  She requested cardiology consult.  Cardiology has been paged.  Dr. Montanez was also notified.  Patient was discussed with Dr. Sparrow.  Patient reported he had had previous stress test which he was told was negative  Patient was evaluated using appropriate PPE      Final diagnoses:   Ventral hernia with obstruction and without gangrene   SVT (supraventricular tachycardia) (CMS/[facility])            Isaiah Finnegan MD  09/06/20 1136       Isaiah Finnegan MD  09/06/20 1148

## 2020-09-06 NOTE — CONSULTS
Surgery (on-call MD unless specified)  Consult performed by: Yo Montanez MD  Consult ordered by: Isaiah Finnegan MD  Reason for consult: ventral hernia          General Surgery Consult Note        Subjective     70-year-old man who is been admitted through the emergency department the chief complaint of worsening abdominal pain associated with a ventral hernia.  He says that he has had this hernia for many years and has been causing intermittent symptoms.  His symptoms have been getting quite a bit worse lately have been associated with significant constipation requiring straining that caused his hernia to bulge more than typical.  This is because some crampy abdominal pain that is occasionally sharp.  He has been associated with some indigestion but no overt nausea or vomiting.  He has had multiple bowel movements in the last 24 hours but they are small and very firm.  He says he has not been passing much gas lately.  On arrival in the emergency department he was found to have a firm tender hernia at his previous incision site.  This is not reducible.  CT scan demonstrating a bilobed hernia with some dilation of proximal bowel concerning for least a partial obstruction.  Was asked to see him for management.    History  Past Medical History:   Diagnosis Date   • Arthritis    • Cancer (CMS/HCC)     basil ca on nose   • Elevated cholesterol    • GERD (gastroesophageal reflux disease)      Hypertension  Past Surgical History:   Procedure Laterality Date   • APPENDECTOMY     • COLONOSCOPY     • ENDOSCOPY       Exploratory laparotomy via midline incision for appendectomy approximately 12 years ago  Family History   Problem Relation Age of Onset   • Heart disease Father    • Cancer Brother      Social History     Tobacco Use   • Smoking status: Former Smoker   Substance Use Topics   • Alcohol use: Yes     Alcohol/week: 2.0 standard drinks     Types: 2 Cans of beer per week   • Drug use: Never       (Not in a  hospital admission)  Allergies:  Patient has no known allergies.    Review of Systems   Constitutional: Negative for chills and fever.   HENT: Negative for sore throat and trouble swallowing.    Eyes: Negative for blurred vision and double vision.   Respiratory: Negative for cough and shortness of breath.    Cardiovascular: Negative for chest pain and leg swelling.   Gastrointestinal: Positive for abdominal distention, abdominal pain, constipation and indigestion. Negative for blood in stool, diarrhea, nausea and vomiting.   Genitourinary: Negative for dysuria and hematuria.   Skin: Negative for rash and bruise.   Neurological: Negative for dizziness and confusion.   Psychiatric/Behavioral: Negative for agitation and depressed mood.        Objective     Vital Signs  Temp:  [98.4 °F (36.9 °C)] 98.4 °F (36.9 °C)  Heart Rate:  [101] 101  Resp:  [16] 16  BP: (129-137)/(71-81) 135/71    Physical Exam   Constitutional: He appears well-developed and well-nourished.   HENT:   Head: Normocephalic and atraumatic.   Eyes: Conjunctivae are normal. No scleral icterus.   Neck: No tracheal deviation present.   Cardiovascular: Intact distal pulses.   Mild tachycardia   Pulmonary/Chest: Effort normal. No respiratory distress.   Abdominal: Soft. He exhibits no distension.   Tender to palpation at the previous incision where there is a bilobed hernia it is minimally reducible there is no overt overlying skin changes.   Musculoskeletal: He exhibits no edema or tenderness.   Lymphadenopathy:     He has no cervical adenopathy.   Neurological: He is alert. No cranial nerve deficit.   Skin: Skin is warm and dry.   Psychiatric: He has a normal mood and affect. His behavior is normal.       Results Review:  Lab Results (last 24 hours)     Procedure Component Value Units Date/Time    BUN [[id number]]  (Abnormal) Collected:  09/06/20 1008    Specimen:  Blood Updated:  09/06/20 1042     BUN 26 mg/dL     Basic Metabolic Panel [[id number]]   (Abnormal) Collected:  09/06/20 1008    Specimen:  Blood Updated:  09/06/20 1041     Glucose 156 mg/dL      BUN --     Comment: Testing performed by alternate method        Creatinine 1.16 mg/dL      Sodium 141 mmol/L      Potassium 4.2 mmol/L      Chloride 105 mmol/L      CO2 25.0 mmol/L      Calcium 9.1 mg/dL      eGFR Non African Amer 62 mL/min/1.73      BUN/Creatinine Ratio --     Comment: Testing not performed        Anion Gap 11.0 mmol/L     Narrative:       GFR Normal >60  Chronic Kidney Disease <60  Kidney Failure <15      Protime-INR [[id number]]  (Normal) Collected:  09/06/20 1008    Specimen:  Blood Updated:  09/06/20 1026     Protime 10.3 Seconds      INR 0.94    Extra Tubes [[id number]] Collected:  09/06/20 1008    Specimen:  Blood, Venous Line Updated:  09/06/20 1025    Narrative:       The following orders were created for panel order Extra Tubes.  Procedure                               Abnormality         Status                     ---------                               -----------         ------                     Gold Top - SST[[id number]]                                   Final result                 Please view results for these tests on the individual orders.    Gold Top - SST [[id number]] Collected:  09/06/20 1008    Specimen:  Blood Updated:  09/06/20 1025    CBC & Differential [[id number]] Collected:  09/06/20 1008    Specimen:  Blood Updated:  09/06/20 1023    Narrative:       The following orders were created for panel order CBC & Differential.  Procedure                               Abnormality         Status                     ---------                               -----------         ------                     CBC Auto Differential[[id number]]        Abnormal            Final result                 Please view results for these tests on the individual orders.    CBC Auto Differential [[id number]]  (Abnormal) Collected:  09/06/20 1008    Specimen:  Blood Updated:  09/06/20 1023     WBC  12.20 10*3/mm3      RBC 4.89 10*6/mm3      Hemoglobin 15.8 g/dL      Hematocrit 45.2 %      MCV 92.3 fL      MCH 32.4 pg      MCHC 35.0 g/dL      RDW 13.0 %      RDW-SD 42.0 fl      MPV 7.3 fL      Platelets 320 10*3/mm3      Neutrophil % 91.0 %      Lymphocyte % 4.8 %      Monocyte % 3.5 %      Eosinophil % 0.1 %      Basophil % 0.6 %      Neutrophils, Absolute 11.10 10*3/mm3      Lymphocytes, Absolute 0.60 10*3/mm3      Monocytes, Absolute 0.40 10*3/mm3      Eosinophils, Absolute 0.00 10*3/mm3      Basophils, Absolute 0.10 10*3/mm3      nRBC 0.1 /100 WBC         Imaging Results (Last 24 Hours)     Procedure Component Value Units Date/Time    CT Abdomen Pelvis Without Contrast [[id number]] Resulted:  09/06/20 0903     Updated:  09/06/20 0858          I reviewed the patient's other test results and agree with the interpretation  I reviewed the patient's new imaging results and agree with the interpretation.    Assessment/Plan     Active Problems:    Ventral hernia without obstruction or gangrene    Incisional hernia nonreducible causing worsening abdominal symptoms that has been present for years.  The fascial defect appears fairly small there is some acute angles where the bowel is exiting the hernia and some dilation upstream suggestive of least a partial obstruction.  I counseled him about the nature of this hernia the alternatives to surgery including further attempts at reduction and abdominal wall binder.  We talked about the steps of the operation including reduction of the bowel closure of the abdominal wall and the indications and the risks of using mesh.  We talked about the expected recovery and the possible complications of this operation.  After talking about his hernia and the options he understands the risk and does wish to proceed with a ventral hernia repair today.    This note was created using Dragon Voice Recognition software.    Yo Montanez MD  09/06/20  10:43

## 2020-09-07 ENCOUNTER — APPOINTMENT (OUTPATIENT)
Dept: CARDIOLOGY | Facility: HOSPITAL | Age: 70
End: 2020-09-07

## 2020-09-07 PROBLEM — I47.10 SVT (SUPRAVENTRICULAR TACHYCARDIA): Status: ACTIVE | Noted: 2020-09-07

## 2020-09-07 PROBLEM — I10 ESSENTIAL HYPERTENSION: Chronic | Status: ACTIVE | Noted: 2020-09-07

## 2020-09-07 PROBLEM — I47.1 SVT (SUPRAVENTRICULAR TACHYCARDIA): Status: ACTIVE | Noted: 2020-09-07

## 2020-09-07 LAB
ANION GAP SERPL CALCULATED.3IONS-SCNC: 8 MMOL/L (ref 5–15)
BASOPHILS # BLD AUTO: 0 10*3/MM3 (ref 0–0.2)
BASOPHILS NFR BLD AUTO: 0.1 % (ref 0–1.5)
BH CV ECHO MEAS - ACS: 2.7 CM
BH CV ECHO MEAS - AO MAX PG (FULL): 0.56 MMHG
BH CV ECHO MEAS - AO MAX PG: 6 MMHG
BH CV ECHO MEAS - AO MEAN PG (FULL): 1.3 MMHG
BH CV ECHO MEAS - AO MEAN PG: 4.2 MMHG
BH CV ECHO MEAS - AO ROOT AREA (BSA CORRECTED): 1.6
BH CV ECHO MEAS - AO ROOT AREA: 12.3 CM^2
BH CV ECHO MEAS - AO ROOT DIAM: 4 CM
BH CV ECHO MEAS - AO V2 MAX: 122.4 CM/SEC
BH CV ECHO MEAS - AO V2 MEAN: 98.5 CM/SEC
BH CV ECHO MEAS - AO V2 VTI: 25.4 CM
BH CV ECHO MEAS - ASC AORTA: 4.3 CM
BH CV ECHO MEAS - AVA(I,A): 6.5 CM^2
BH CV ECHO MEAS - AVA(I,D): 6.5 CM^2
BH CV ECHO MEAS - AVA(V,A): 6.3 CM^2
BH CV ECHO MEAS - AVA(V,D): 6.3 CM^2
BH CV ECHO MEAS - BSA(HAYCOCK): 2.5 M^2
BH CV ECHO MEAS - BSA: 2.4 M^2
BH CV ECHO MEAS - BZI_BMI: 33.4 KILOGRAMS/M^2
BH CV ECHO MEAS - BZI_METRIC_HEIGHT: 188 CM
BH CV ECHO MEAS - BZI_METRIC_WEIGHT: 117.9 KG
BH CV ECHO MEAS - EDV(CUBED): 136.2 ML
BH CV ECHO MEAS - EDV(MOD-SP4): 68.3 ML
BH CV ECHO MEAS - EDV(TEICH): 126.3 ML
BH CV ECHO MEAS - EF(CUBED): 65.5 %
BH CV ECHO MEAS - EF(MOD-BP): 63 %
BH CV ECHO MEAS - EF(MOD-SP4): 62.7 %
BH CV ECHO MEAS - EF(TEICH): 56.7 %
BH CV ECHO MEAS - ESV(CUBED): 46.9 ML
BH CV ECHO MEAS - ESV(MOD-SP4): 25.4 ML
BH CV ECHO MEAS - ESV(TEICH): 54.7 ML
BH CV ECHO MEAS - FS: 29.9 %
BH CV ECHO MEAS - IVS/LVPW: 0.92
BH CV ECHO MEAS - IVSD: 1.4 CM
BH CV ECHO MEAS - LA DIMENSION(2D): 3.3 CM
BH CV ECHO MEAS - LV DIASTOLIC VOL/BSA (35-75): 28.1 ML/M^2
BH CV ECHO MEAS - LV MASS(C)D: 310.9 GRAMS
BH CV ECHO MEAS - LV MASS(C)DI: 128 GRAMS/M^2
BH CV ECHO MEAS - LV MAX PG: 5.4 MMHG
BH CV ECHO MEAS - LV MEAN PG: 2.9 MMHG
BH CV ECHO MEAS - LV SYSTOLIC VOL/BSA (12-30): 10.5 ML/M^2
BH CV ECHO MEAS - LV V1 MAX: 116.5 CM/SEC
BH CV ECHO MEAS - LV V1 MEAN: 78.3 CM/SEC
BH CV ECHO MEAS - LV V1 VTI: 24.9 CM
BH CV ECHO MEAS - LVIDD: 5.1 CM
BH CV ECHO MEAS - LVIDS: 3.6 CM
BH CV ECHO MEAS - LVOT AREA: 6.6 CM^2
BH CV ECHO MEAS - LVOT DIAM: 2.9 CM
BH CV ECHO MEAS - LVPWD: 1.5 CM
BH CV ECHO MEAS - MV A MAX VEL: 102.6 CM/SEC
BH CV ECHO MEAS - MV DEC SLOPE: 303.3 CM/SEC^2
BH CV ECHO MEAS - MV DEC TIME: 0.28 SEC
BH CV ECHO MEAS - MV E MAX VEL: 86.4 CM/SEC
BH CV ECHO MEAS - MV E/A: 0.84
BH CV ECHO MEAS - MV MAX PG: 4.1 MMHG
BH CV ECHO MEAS - MV MEAN PG: 2.4 MMHG
BH CV ECHO MEAS - MV V2 MAX: 101.1 CM/SEC
BH CV ECHO MEAS - MV V2 MEAN: 75.8 CM/SEC
BH CV ECHO MEAS - MV V2 VTI: 24.1 CM
BH CV ECHO MEAS - MVA(VTI): 6.8 CM^2
BH CV ECHO MEAS - PA ACC TIME: 0.14 SEC
BH CV ECHO MEAS - PA MAX PG (FULL): 1.7 MMHG
BH CV ECHO MEAS - PA MAX PG: 3.7 MMHG
BH CV ECHO MEAS - PA MEAN PG (FULL): 1.6 MMHG
BH CV ECHO MEAS - PA MEAN PG: 2.4 MMHG
BH CV ECHO MEAS - PA PR(ACCEL): 16.7 MMHG
BH CV ECHO MEAS - PA V2 MAX: 95.6 CM/SEC
BH CV ECHO MEAS - PA V2 MEAN: 75.8 CM/SEC
BH CV ECHO MEAS - PA V2 VTI: 20.3 CM
BH CV ECHO MEAS - PVA(I,A): 3.5 CM^2
BH CV ECHO MEAS - PVA(I,D): 3.5 CM^2
BH CV ECHO MEAS - PVA(V,A): 4.4 CM^2
BH CV ECHO MEAS - PVA(V,D): 4.4 CM^2
BH CV ECHO MEAS - QP/QS: 0.43
BH CV ECHO MEAS - RAP SYSTOLE: 3 MMHG
BH CV ECHO MEAS - RV MAX PG: 1.9 MMHG
BH CV ECHO MEAS - RV MEAN PG: 0.85 MMHG
BH CV ECHO MEAS - RV V1 MAX: 69.4 CM/SEC
BH CV ECHO MEAS - RV V1 MEAN: 42.9 CM/SEC
BH CV ECHO MEAS - RV V1 VTI: 11.5 CM
BH CV ECHO MEAS - RVDD: 3.6 CM
BH CV ECHO MEAS - RVOT AREA: 6.1 CM^2
BH CV ECHO MEAS - RVOT DIAM: 2.8 CM
BH CV ECHO MEAS - RVSP: 23.7 MMHG
BH CV ECHO MEAS - SI(AO): 129.2 ML/M^2
BH CV ECHO MEAS - SI(CUBED): 36.7 ML/M^2
BH CV ECHO MEAS - SI(LVOT): 67.8 ML/M^2
BH CV ECHO MEAS - SI(MOD-SP4): 17.6 ML/M^2
BH CV ECHO MEAS - SI(TEICH): 29.5 ML/M^2
BH CV ECHO MEAS - SV(AO): 313.8 ML
BH CV ECHO MEAS - SV(CUBED): 89.3 ML
BH CV ECHO MEAS - SV(LVOT): 164.7 ML
BH CV ECHO MEAS - SV(MOD-SP4): 42.8 ML
BH CV ECHO MEAS - SV(RVOT): 70 ML
BH CV ECHO MEAS - SV(TEICH): 71.7 ML
BH CV ECHO MEAS - TR MAX VEL: 227.6 CM/SEC
BUN SERPL-MCNC: 26 MG/DL (ref 8–23)
BUN SERPL-MCNC: ABNORMAL MG/DL
BUN/CREAT SERPL: ABNORMAL
CALCIUM SPEC-SCNC: 7.8 MG/DL (ref 8.6–10.5)
CHLORIDE SERPL-SCNC: 106 MMOL/L (ref 98–107)
CO2 SERPL-SCNC: 26 MMOL/L (ref 22–29)
CREAT SERPL-MCNC: 1.15 MG/DL (ref 0.76–1.27)
DEPRECATED RDW RBC AUTO: 42.4 FL (ref 37–54)
EOSINOPHIL # BLD AUTO: 0 10*3/MM3 (ref 0–0.4)
EOSINOPHIL NFR BLD AUTO: 0 % (ref 0.3–6.2)
ERYTHROCYTE [DISTWIDTH] IN BLOOD BY AUTOMATED COUNT: 13 % (ref 12.3–15.4)
GFR SERPL CREATININE-BSD FRML MDRD: 63 ML/MIN/1.73
GLUCOSE SERPL-MCNC: 133 MG/DL (ref 65–99)
HCT VFR BLD AUTO: 37.7 % (ref 37.5–51)
HGB BLD-MCNC: 13.1 G/DL (ref 13–17.7)
LYMPHOCYTES # BLD AUTO: 0.5 10*3/MM3 (ref 0.7–3.1)
LYMPHOCYTES NFR BLD AUTO: 5.7 % (ref 19.6–45.3)
MAGNESIUM SERPL-MCNC: 2.2 MG/DL (ref 1.6–2.4)
MCH RBC QN AUTO: 32.6 PG (ref 26.6–33)
MCHC RBC AUTO-ENTMCNC: 34.8 G/DL (ref 31.5–35.7)
MCV RBC AUTO: 93.6 FL (ref 79–97)
MONOCYTES # BLD AUTO: 0.8 10*3/MM3 (ref 0.1–0.9)
MONOCYTES NFR BLD AUTO: 9.9 % (ref 5–12)
NEUTROPHILS NFR BLD AUTO: 7.1 10*3/MM3 (ref 1.7–7)
NEUTROPHILS NFR BLD AUTO: 84.3 % (ref 42.7–76)
NRBC BLD AUTO-RTO: 0.1 /100 WBC (ref 0–0.2)
PLATELET # BLD AUTO: 270 10*3/MM3 (ref 140–450)
PMV BLD AUTO: 7.3 FL (ref 6–12)
POTASSIUM SERPL-SCNC: 4.3 MMOL/L (ref 3.5–5.2)
RBC # BLD AUTO: 4.03 10*6/MM3 (ref 4.14–5.8)
SODIUM SERPL-SCNC: 140 MMOL/L (ref 136–145)
WBC # BLD AUTO: 8.4 10*3/MM3 (ref 3.4–10.8)

## 2020-09-07 PROCEDURE — 93306 TTE W/DOPPLER COMPLETE: CPT | Performed by: INTERNAL MEDICINE

## 2020-09-07 PROCEDURE — 25010000003 CEFAZOLIN PER 500 MG: Performed by: SURGERY

## 2020-09-07 PROCEDURE — 99233 SBSQ HOSP IP/OBS HIGH 50: CPT | Performed by: INTERNAL MEDICINE

## 2020-09-07 PROCEDURE — 80048 BASIC METABOLIC PNL TOTAL CA: CPT | Performed by: FAMILY MEDICINE

## 2020-09-07 PROCEDURE — 25010000002 SULFUR HEXAFLUORIDE MICROSPH 60.7-25 MG RECONSTITUTED SUSPENSION: Performed by: FAMILY MEDICINE

## 2020-09-07 PROCEDURE — 93306 TTE W/DOPPLER COMPLETE: CPT

## 2020-09-07 PROCEDURE — 94799 UNLISTED PULMONARY SVC/PX: CPT

## 2020-09-07 PROCEDURE — 83735 ASSAY OF MAGNESIUM: CPT | Performed by: FAMILY MEDICINE

## 2020-09-07 PROCEDURE — 85025 COMPLETE CBC W/AUTO DIFF WBC: CPT | Performed by: FAMILY MEDICINE

## 2020-09-07 PROCEDURE — 25010000002 ENOXAPARIN PER 10 MG: Performed by: SURGERY

## 2020-09-07 PROCEDURE — 99024 POSTOP FOLLOW-UP VISIT: CPT | Performed by: SURGERY

## 2020-09-07 RX ORDER — DOCUSATE SODIUM 100 MG/1
100 CAPSULE, LIQUID FILLED ORAL 2 TIMES DAILY
Status: DISCONTINUED | OUTPATIENT
Start: 2020-09-07 | End: 2020-09-09 | Stop reason: HOSPADM

## 2020-09-07 RX ADMIN — AMLODIPINE BESYLATE 5 MG: 5 TABLET ORAL at 08:48

## 2020-09-07 RX ADMIN — CALCIUM ACETATE 1334 MG: 667 CAPSULE ORAL at 20:13

## 2020-09-07 RX ADMIN — METOPROLOL TARTRATE 12.5 MG: 25 TABLET, FILM COATED ORAL at 20:13

## 2020-09-07 RX ADMIN — SULFUR HEXAFLUORIDE 2 ML: KIT at 08:30

## 2020-09-07 RX ADMIN — Medication 100 MG: at 08:49

## 2020-09-07 RX ADMIN — Medication 10 ML: at 08:50

## 2020-09-07 RX ADMIN — HYDROCODONE BITARTRATE AND ACETAMINOPHEN 1 TABLET: 5; 325 TABLET ORAL at 14:53

## 2020-09-07 RX ADMIN — SODIUM CHLORIDE, SODIUM LACTATE, POTASSIUM CHLORIDE, AND CALCIUM CHLORIDE 125 ML/HR: 600; 310; 30; 20 INJECTION, SOLUTION INTRAVENOUS at 15:43

## 2020-09-07 RX ADMIN — Medication 10 ML: at 20:13

## 2020-09-07 RX ADMIN — DOCUSATE SODIUM 100 MG: 100 CAPSULE, LIQUID FILLED ORAL at 20:18

## 2020-09-07 RX ADMIN — TAMSULOSIN HYDROCHLORIDE 0.4 MG: 0.4 CAPSULE ORAL at 08:48

## 2020-09-07 RX ADMIN — CEFAZOLIN 1 G: 1 INJECTION, POWDER, FOR SOLUTION INTRAMUSCULAR; INTRAVENOUS at 07:13

## 2020-09-07 RX ADMIN — LOSARTAN POTASSIUM 100 MG: 50 TABLET, FILM COATED ORAL at 08:48

## 2020-09-07 RX ADMIN — PANTOPRAZOLE SODIUM 40 MG: 40 TABLET, DELAYED RELEASE ORAL at 07:13

## 2020-09-07 RX ADMIN — ENOXAPARIN SODIUM 40 MG: 40 INJECTION SUBCUTANEOUS at 15:43

## 2020-09-07 RX ADMIN — DOCUSATE SODIUM 100 MG: 100 CAPSULE, LIQUID FILLED ORAL at 14:31

## 2020-09-07 RX ADMIN — SODIUM CHLORIDE, SODIUM LACTATE, POTASSIUM CHLORIDE, AND CALCIUM CHLORIDE 125 ML/HR: 600; 310; 30; 20 INJECTION, SOLUTION INTRAVENOUS at 08:46

## 2020-09-07 RX ADMIN — METOPROLOL TARTRATE 12.5 MG: 25 TABLET, FILM COATED ORAL at 08:48

## 2020-09-07 RX ADMIN — CALCIUM ACETATE 1334 MG: 667 CAPSULE ORAL at 08:48

## 2020-09-07 NOTE — PROGRESS NOTES
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  Ventral abdominal hernia plans for surgery today  Stress testing in 2015 that was normal  Hypertension  Status post ventral hernia repair  Patient is currently in sinus rhythm  Low-dose beta-blockers as tolerated  Patient underwent surgical procedure without any complication  Echocardiogram to assess LV systolic function  Further recommendations based on patient hospital course        Chief Complaint: Palpitations    Subjective no further episodes of SVT    Interval History: No significant change in the overall status    History taken from: patient    Review of Systems:  Review of Systems   Constitution: Negative for chills, decreased appetite and malaise/fatigue.   HENT: Negative for congestion.    Eyes: Negative for blurred vision and double vision.   Cardiovascular: Negative for chest pain, dyspnea on exertion, irregular heartbeat, leg swelling, near-syncope, orthopnea, palpitations, paroxysmal nocturnal dyspnea and syncope.   Respiratory: Negative for cough and shortness of breath.    Hematologic/Lymphatic: Negative for adenopathy. Does not bruise/bleed easily.   Skin: Negative for rash.   Gastrointestinal: Negative for bloating and abdominal pain.   Neurological: Negative for dizziness and focal weakness.       Objective    Vital Signs  Visit Vitals  /83 (BP Location: Right arm, Patient Position: Lying)   Pulse 82   Temp 98.5 °F (36.9 °C) (Oral)   Resp 20   Ht 193 cm (76\")   Wt 118 kg (260 lb)   SpO2 92%   BMI 31.65 kg/m²     Oxygen Therapy  SpO2: 92 %  Pulse Oximetry Type: Intermittent  Device (Oxygen Therapy): room air  Device (Oxygen Therapy): room air  Flow (L/min): 2  Flowsheet Rows      First Filed Value   Admission Height  188 cm (74\") Documented at 09/06/2020 0827   Admission Weight  118 kg (260 " lb) Documented at 09/06/2020 0827        Intake & Output (last 3 days)       09/04 0701 - 09/05 0700 09/05 0701 - 09/06 0700 09/06 0701 - 09/07 0700 09/07 0701 - 09/08 0700    P.O.   180     I.V. (mL/kg)   1800 (15.3)     Total Intake(mL/kg)   1980 (16.8)     Urine (mL/kg/hr)   1070 400 (0.6)    Total Output   1070 400    Net   +910 -400                Lines, Drains & Airways    Active LDAs     Name:   Placement date:   Placement time:   Site:   Days:    Peripheral IV 09/06/20 1008 Left Forearm   09/06/20    1008    Forearm   1                Physical Exam:  Physical Exam   Constitutional: He is oriented to person, place, and time. He appears well-developed and well-nourished.   HENT:   Head: Normocephalic and atraumatic.   Eyes: Pupils are equal, round, and reactive to light. Conjunctivae are normal.   Neck: Normal range of motion. Neck supple. No thyromegaly present.   Cardiovascular: Normal rate, regular rhythm, S1 normal, S2 normal and intact distal pulses.   Pulmonary/Chest: Effort normal and breath sounds normal.   Abdominal: Soft. Bowel sounds are normal.   Musculoskeletal: He exhibits no edema.   Neurological: He is alert and oriented to person, place, and time.   Skin: Skin is warm.   Nursing note and vitals reviewed.      Results Review:     I reviewed the patient's new clinical results.    Lab Results (last 24 hours)     Procedure Component Value Units Date/Time    BUN [[id number]]  (Abnormal) Collected:  09/07/20 0323    Specimen:  Blood Updated:  09/07/20 0745     BUN 26 mg/dL     Basic Metabolic Panel [[id number]]  (Abnormal) Collected:  09/07/20 0323    Specimen:  Blood Updated:  09/07/20 0503     Glucose 133 mg/dL      BUN --     Comment: Testing performed by alternate method        Creatinine 1.15 mg/dL      Sodium 140 mmol/L      Potassium 4.3 mmol/L      Chloride 106 mmol/L      CO2 26.0 mmol/L      Calcium 7.8 mg/dL      eGFR Non African Amer 63 mL/min/1.73      BUN/Creatinine Ratio --     Comment:  Testing not performed        Anion Gap 8.0 mmol/L     Narrative:       GFR Normal >60  Chronic Kidney Disease <60  Kidney Failure <15      Magnesium [[id number]]  (Normal) Collected:  09/07/20 0323    Specimen:  Blood Updated:  09/07/20 0503     Magnesium 2.2 mg/dL     CBC & Differential [[id number]] Collected:  09/07/20 0323    Specimen:  Blood Updated:  09/07/20 0443    Narrative:       The following orders were created for panel order CBC & Differential.  Procedure                               Abnormality         Status                     ---------                               -----------         ------                     CBC Auto Differential[[id number]]        Abnormal            Final result                 Please view results for these tests on the individual orders.    CBC Auto Differential [[id number]]  (Abnormal) Collected:  09/07/20 0323    Specimen:  Blood Updated:  09/07/20 0443     WBC 8.40 10*3/mm3      RBC 4.03 10*6/mm3      Hemoglobin 13.1 g/dL      Comment: Result checked         Hematocrit 37.7 %      MCV 93.6 fL      MCH 32.6 pg      MCHC 34.8 g/dL      RDW 13.0 %      RDW-SD 42.4 fl      MPV 7.3 fL      Platelets 270 10*3/mm3      Neutrophil % 84.3 %      Lymphocyte % 5.7 %      Monocyte % 9.9 %      Eosinophil % 0.0 %      Basophil % 0.1 %      Neutrophils, Absolute 7.10 10*3/mm3      Lymphocytes, Absolute 0.50 10*3/mm3      Monocytes, Absolute 0.80 10*3/mm3      Eosinophils, Absolute 0.00 10*3/mm3      Basophils, Absolute 0.00 10*3/mm3      nRBC 0.1 /100 WBC     Hemoglobin A1c [[id number]]  (Normal) Collected:  09/06/20 1008    Specimen:  Blood Updated:  09/06/20 1552     Hemoglobin A1C 5.3 %     Narrative:       Hemoglobin A1C Reference Range:    <5.7 %        Normal  5.7-6.4 %     Increased risk for diabetes  > 6.4 %        Diabetes       These guidelines have been recommended by the American Diabetic Association for Hgb A1c.      The following 2010 guidelines have been recommended by the  American Diabetes Association for Hemoglobin A1c.    HBA1c 5.7-6.4% Increased risk for future diabetes (pre-diabetes)  HBA1c     >6.4% Diabetes                 Medication Review:   I have reviewed the patient's current medication list  Scheduled Meds:  amLODIPine 5 mg Oral Q24H   calcium acetate 1,334 mg Oral BID   docusate sodium 100 mg Oral BID   enoxaparin 40 mg Subcutaneous Q24H   losartan 100 mg Oral Daily   metoprolol tartrate 12.5 mg Oral Q12H   pantoprazole 40 mg Oral QAM   sodium chloride 10 mL Intravenous Q12H   tamsulosin 0.4 mg Oral Daily   thiamine 100 mg Oral Daily     Continuous Infusions:  lactated ringers 125 mL/hr Last Rate: 125 mL/hr (09/07/20 0846)     PRN Meds:.•  acetaminophen **OR** acetaminophen **OR** acetaminophen  •  bisacodyl  •  HYDROcodone-acetaminophen  •  magnesium hydroxide  •  magnesium sulfate **OR** magnesium sulfate **OR** magnesium sulfate  •  melatonin  •  Morphine **AND** naloxone  •  nitroglycerin  •  ondansetron **OR** ondansetron  •  potassium chloride **OR** potassium chloride **OR** potassium chloride  •  [COMPLETED] Insert peripheral IV **AND** sodium chloride  •  sodium chloride    ECG/EMG Results (last 24 hours)     Procedure Component Value Units Date/Time    Adult Transthoracic Echo Complete W/ Cont if Necessary Per Protocol [[id number]] Collected:  09/07/20 0747     Updated:  09/07/20 0842     BSA 2.4 m^2      RVIDd 3.6 cm      IVSd 1.4 cm      LVIDd 5.1 cm      LVIDs 3.6 cm      LVPWd 1.5 cm      IVS/LVPW 0.92     FS 29.9 %      EDV(Teich) 126.3 ml      ESV(Teich) 54.7 ml      EF(Teich) 56.7 %      EDV(cubed) 136.2 ml      ESV(cubed) 46.9 ml      EF(cubed) 65.5 %      LV mass(C)d 310.9 grams      LV mass(C)dI 128.0 grams/m^2      SV(Teich) 71.7 ml      SI(Teich) 29.5 ml/m^2      SV(cubed) 89.3 ml      SI(cubed) 36.7 ml/m^2      Ao root diam 4.0 cm      Ao root area 12.3 cm^2      ACS 2.7 cm      asc Aorta Diam 4.3 cm      LVOT diam 2.9 cm      LVOT area 6.6 cm^2       RVOT diam 2.8 cm      RVOT area 6.1 cm^2      EDV(MOD-sp4) 68.3 ml      ESV(MOD-sp4) 25.4 ml      EF(MOD-sp4) 62.7 %      SV(MOD-sp4) 42.8 ml      SI(MOD-sp4) 17.6 ml/m^2      Ao root area (BSA corrected) 1.6     LV [place] Vol (BSA corrected) 28.1 ml/m^2      LV Sys Vol (BSA corrected) 10.5 ml/m^2      MV E max brandie 86.4 cm/sec      MV A max brandie 102.6 cm/sec      MV E/A 0.84     MV V2 max 101.1 cm/sec      MV max PG 4.1 mmHg      MV V2 mean 75.8 cm/sec      MV mean PG 2.4 mmHg      MV V2 VTI 24.1 cm      MVA(VTI) 6.8 cm^2      MV dec slope 303.3 cm/sec^2      MV dec time 0.28 sec      Ao pk brandie 122.4 cm/sec      Ao max PG 6.0 mmHg      Ao max PG (full) 0.56 mmHg      Ao V2 mean 98.5 cm/sec      Ao mean PG 4.2 mmHg      Ao mean PG (full) 1.3 mmHg      Ao V2 VTI 25.4 cm      RUPERTO(I,A) 6.5 cm^2      RUPERTO(I,D) 6.5 cm^2      RUPERTO(V,A) 6.3 cm^2      RUPERTO(V,D) 6.3 cm^2      LV V1 max PG 5.4 mmHg      LV V1 mean PG 2.9 mmHg      LV V1 max 116.5 cm/sec      LV V1 mean 78.3 cm/sec      LV V1 VTI 24.9 cm      SV(Ao) 313.8 ml      SI(Ao) 129.2 ml/m^2      SV(LVOT) 164.7 ml      SV(RVOT) 70.0 ml      SI(LVOT) 67.8 ml/m^2      PA V2 max 95.6 cm/sec      PA max PG 3.7 mmHg      PA max PG (full) 1.7 mmHg      PA V2 mean 75.8 cm/sec      PA mean PG 2.4 mmHg      PA mean PG (full) 1.6 mmHg      PA V2 VTI 20.3 cm      PVA(I,A) 3.5 cm^2      BH CV ECHO MARIA ESTHER - PVA(I,D) 3.5 cm^2      BH CV ECHO MARIA ESTHER - PVA(V,A) 4.4 cm^2       CV ECHO MARIA ESTHER - PVA(V,D) 4.4 cm^2      PA acc time 0.14 sec      RV V1 max PG 1.9 mmHg      RV V1 mean PG 0.85 mmHg      RV V1 max 69.4 cm/sec      RV V1 mean 42.9 cm/sec      RV V1 VTI 11.5 cm      TR max brandie 227.6 cm/sec      RVSP(TR) 23.7 mmHg      RAP systole 3.0 mmHg      PA pr(Accel) 16.7 mmHg      Qp/Qs 0.43      CV ECHO MARIA ESTHER - BZI_BMI 33.4 kilograms/m^2       CV ECHO MARIA ESTHER - BSA(HAYCOCK) 2.5 m^2       CV ECHO MARIA ESTHER - BZI_METRIC_WEIGHT 117.9 kg       CV ECHO MARIA ESTHER - BZI_METRIC_HEIGHT 188.0 cm      EF(MOD-bp)  63.0 %      LA dimension(2D) 3.3 cm     ECG 12 Lead [[id number]] Collected:  09/06/20 1131     Updated:  09/07/20 1052    Narrative:       HEART RATE= 84  bpm  RR Interval= 716  ms  DE Interval= 188  ms  P Horizontal Axis= -7  deg  P Front Axis= 52  deg  QRSD Interval= 108  ms  QT Interval= 385  ms  QRS Axis= -70  deg  T Wave Axis= 38  deg  - ABNORMAL ECG -  Sinus rhythm  Small inferior Q waves  Compare to previous Sinus rhythm now present  Electronically Signed By: Isaiah Finnegan ([facility]) 07-Sep-2020 10:51:13  Date and Time of Study: 2020-09-06 11:31:55    ECG 12 Lead [[id number]] Collected:  09/06/20 1107     Updated:  09/07/20 1052    Narrative:       HEART RATE= 145  bpm  RR Interval= 412  ms  DE Interval= 116  ms  P Horizontal Axis=   deg  P Front Axis= 0  deg  QRSD Interval= 106  ms  QT Interval= 336  ms  QRS Axis= 260  deg  T Wave Axis= -61  deg  - ABNORMAL ECG -  Supraventricular tachycardia  LAD, consider left anterior fascicular block  Repolarization abnormality, prob rate related  Prolonged QT interval  No previous ECG available for comparison  Electronically Signed By: Isaiah Finnegan ([facility]) 07-Sep-2020 10:51:39  Date and Time of Study: 2020-09-06 11:07:45          Imaging Results (Last 24 Hours)     ** No results found for the last 24 hours. **          Assessment/Plan       Ventral hernia with obstruction and without gangrene    SVT (supraventricular tachycardia) (CMS/HCC)    Essential hypertension        Yair Sparrow MD  09/07/20  12:42

## 2020-09-07 NOTE — PROGRESS NOTES
General Surgery Progress Note     LOS: 1 day   Patient Care Team:  Mani Aceves MD as PCP - General (Family Medicine)    Subjective     Interval History:   Postop day 1 ventral hernia repair for incarcerated incisional hernia with signs of obstruction    Tolerating some clear liquid diet with some minimal nausea overnight.  Pain is reasonably well-controlled but is certainly worse with movement.  Has not yet been ambulatory.    Objective     Vital Signs  Temp:  [97.5 °F (36.4 °C)-98.9 °F (37.2 °C)] 98.4 °F (36.9 °C)  Heart Rate:  [] 76  Resp:  [15-22] 16  BP: (117-158)/(66-87) 158/76    Physical Exam   Constitutional: He appears well-developed and well-nourished.   HENT:   Head: Normocephalic and atraumatic.   Pulmonary/Chest: Effort normal.   Abdominal:   Soft and obese appropriately tender to palpation in his abdominal binder   Musculoskeletal: He exhibits no edema or tenderness.   Neurological: He is alert. No cranial nerve deficit.   Skin: Skin is warm and dry.   Psychiatric: He has a normal mood and affect. His behavior is normal.          Results Review:    Lab Results (last 24 hours)     Procedure Component Value Units Date/Time    BUN [[id number]]  (Abnormal) Collected:  09/07/20 0323    Specimen:  Blood Updated:  09/07/20 0745     BUN 26 mg/dL     Basic Metabolic Panel [[id number]]  (Abnormal) Collected:  09/07/20 0323    Specimen:  Blood Updated:  09/07/20 0503     Glucose 133 mg/dL      BUN --     Comment: Testing performed by alternate method        Creatinine 1.15 mg/dL      Sodium 140 mmol/L      Potassium 4.3 mmol/L      Chloride 106 mmol/L      CO2 26.0 mmol/L      Calcium 7.8 mg/dL      eGFR Non African Amer 63 mL/min/1.73      BUN/Creatinine Ratio --     Comment: Testing not performed        Anion Gap 8.0 mmol/L     Narrative:       GFR Normal >60  Chronic Kidney Disease <60  Kidney Failure <15      Magnesium [[id number]]  (Normal) Collected:  09/07/20 0323    Specimen:  Blood Updated:   09/07/20 0503     Magnesium 2.2 mg/dL     CBC & Differential [[id number]] Collected:  09/07/20 0323    Specimen:  Blood Updated:  09/07/20 0443    Narrative:       The following orders were created for panel order CBC & Differential.  Procedure                               Abnormality         Status                     ---------                               -----------         ------                     CBC Auto Differential[[id number]]        Abnormal            Final result                 Please view results for these tests on the individual orders.    CBC Auto Differential [[id number]]  (Abnormal) Collected:  09/07/20 0323    Specimen:  Blood Updated:  09/07/20 0443     WBC 8.40 10*3/mm3      RBC 4.03 10*6/mm3      Hemoglobin 13.1 g/dL      Comment: Result checked         Hematocrit 37.7 %      MCV 93.6 fL      MCH 32.6 pg      MCHC 34.8 g/dL      RDW 13.0 %      RDW-SD 42.4 fl      MPV 7.3 fL      Platelets 270 10*3/mm3      Neutrophil % 84.3 %      Lymphocyte % 5.7 %      Monocyte % 9.9 %      Eosinophil % 0.0 %      Basophil % 0.1 %      Neutrophils, Absolute 7.10 10*3/mm3      Lymphocytes, Absolute 0.50 10*3/mm3      Monocytes, Absolute 0.80 10*3/mm3      Eosinophils, Absolute 0.00 10*3/mm3      Basophils, Absolute 0.00 10*3/mm3      nRBC 0.1 /100 WBC     Hemoglobin A1c [[id number]]  (Normal) Collected:  09/06/20 1008    Specimen:  Blood Updated:  09/06/20 1552     Hemoglobin A1C 5.3 %     Narrative:       Hemoglobin A1C Reference Range:    <5.7 %        Normal  5.7-6.4 %     Increased risk for diabetes  > 6.4 %        Diabetes       These guidelines have been recommended by the American Diabetic Association for Hgb A1c.      The following 2010 guidelines have been recommended by the American Diabetes Association for Hemoglobin A1c.    HBA1c 5.7-6.4% Increased risk for future diabetes (pre-diabetes)  HBA1c     >6.4% Diabetes      COVID-19,CEPHEID,COR/BETTY/PAD IN-HOUSE(OR EMERGENT/ADD-ON),NP SWAB IN TRANSPORT  MEDIA 3-4 HR TAT - Swab, Nasopharynx [[id number]]  (Normal) Collected:  09/06/20 1041    Specimen:  Swab from Nasopharynx Updated:  09/06/20 1148     COVID19 Not Detected    Narrative:       Fact sheet for providers: https://www.fda.gov/media/019533/download     Fact sheet for patients: https://www.fda.gov/media/285222/download        Imaging Results (Last 24 Hours)     Procedure Component Value Units Date/Time    CT Abdomen Pelvis Without Contrast [[id number]] Collected:  09/06/20 0903     Updated:  09/06/20 1137    Narrative:          DATE OF EXAM:  9/6/2020 8:54 AM     PROCEDURE:  CT ABDOMEN PELVIS WO CONTRAST-     INDICATIONS:  hernia     COMPARISON:  No Comparisons Available     TECHNIQUE:  Routine transaxial slices were obtained through the abdomen and pelvis  without the administration of intravenous contrast. Reconstructed  coronal and sagittal images were also obtained. Automated exposure  control and iterative construction methods were used.     FINDINGS:  There are old healed right-sided rib fractures with some pleural  thickening at the site and some linear scarring in the right base  multiple gallstones are present in the gallbladder. The liver has a  normal appearance. The spleen, adrenal glands, and pancreas are within  normal limits. There is a dense cortical calcification identified in the  upper pole of the left kidney, likely a stone with overlying renal  cortical thinning. This calcification measures 12 mm in diameter. There  is an adjacent renal cyst. There is no evidence of obstructive uropathy.  There is a ventral abdominal wall hernia just above the umbilicus. The  defect in the anterior abdominal wall measures 5 cm in diameter. There  is a small bowel loop prolapsed through the defect with evidence of  small bowel obstruction. Distal to this loop of bowel, the small bowel  is decompressed as is the colon. There is sigmoid diverticulosis. The  bladder appears normal. The appendix is absent.        Impression:          1. Ventral abdominal wall hernia just above the umbilicus with a loop of  small bowel prolapsed through the hernia defect and evidence of small  bowel obstruction secondary to the hernia.  2. Cholelithiasis  3. Sigmoid diverticulosis without diverticulitis     Electronically Signed By-Florencio Sarabia On:9/6/2020 9:07 AM  This report was finalized on [id number] by  Florencio Sarabia, .         I reviewed the patient's new clinical results.    Medication Review:    Current Facility-Administered Medications:   •  acetaminophen (TYLENOL) tablet 650 mg, 650 mg, Oral, Q4H PRN **OR** acetaminophen (TYLENOL) 160 MG/5ML solution 650 mg, 650 mg, Oral, Q4H PRN **OR** acetaminophen (TYLENOL) suppository 650 mg, 650 mg, Rectal, Q4H PRN, Yovana Alfredo, DO  •  amLODIPine (NORVASC) tablet 5 mg, 5 mg, Oral, Q24H, Yovana Alfredo DO, 5 mg at 09/07/20 0848  •  bisacodyl (DULCOLAX) suppository 10 mg, 10 mg, Rectal, Daily PRN, Yovana Alfredo DO  •  calcium acetate (PHOS BINDER)) capsule 1,334 mg, 1,334 mg, Oral, BID, Yovana Alfredo DO, 1,334 mg at 09/07/20 0848  •  HYDROcodone-acetaminophen (NORCO) 5-325 MG per tablet 1 tablet, 1 tablet, Oral, Q4H PRN, Yovana Alfredo DO, 1 tablet at 09/06/20 2234  •  lactated ringers infusion, 125 mL/hr, Intravenous, Continuous, Yovana Alfredo DO, Last Rate: 125 mL/hr at 09/07/20 0846, 125 mL/hr at 09/07/20 0846  •  losartan (COZAAR) tablet 100 mg, 100 mg, Oral, Daily, Yovana Alfredo DO, 100 mg at 09/07/20 0848  •  magnesium hydroxide (MILK OF MAGNESIA) suspension 2400 mg/10mL 10 mL, 10 mL, Oral, Daily PRN, Yovana Alfredo DO  •  Magnesium Sulfate 2 gram Bolus, followed by 8 gram infusion (total Mg dose 10 grams)- Mg less than or equal to 1mg/dL, 2 g, Intravenous, PRN **OR** Magnesium Sulfate 2 gram / 50mL Infusion (GIVE X 3 BAGS TO EQUAL 6GM TOTAL DOSE) - Mg 1.1 - 1.5 mg/dl, 2 g, Intravenous, PRN **OR** Magnesium Sulfate 4 gram infusion- Mg 1.6-1.9 mg/dL, 4 g, Intravenous, PRN,  Yovana Alfredo,   •  melatonin tablet 5 mg, 5 mg, Oral, Nightly PRN, Yovana Alfredo A, DO  •  metoprolol tartrate (LOPRESSOR) tablet 12.5 mg, 12.5 mg, Oral, Q12H, Yovana Alfredo A, DO, 12.5 mg at 20 0848  •  Morphine sulfate (PF) injection 4 mg, 4 mg, Intravenous, Q2H PRN **AND** naloxone (NARCAN) injection 0.4 mg, 0.4 mg, Intravenous, Q5 Min PRN, Yovana Alfredo A, DO  •  nitroglycerin (NITROSTAT) SL tablet 0.4 mg, 0.4 mg, Sublingual, Q5 Min PRN, Yovana Alfredo, DO  •  ondansetron (ZOFRAN) tablet 4 mg, 4 mg, Oral, Q6H PRN **OR** ondansetron (ZOFRAN) injection 4 mg, 4 mg, Intravenous, Q6H PRN, Yovana Alfredo, , 4 mg at 20 2240  •  pantoprazole (PROTONIX) EC tablet 40 mg, 40 mg, Oral, QAM, Yovana Alfredo, DO, 40 mg at 20 0713  •  potassium chloride (K-DUR,KLOR-CON) CR tablet 40 mEq, 40 mEq, Oral, PRN **OR** potassium chloride (KLOR-CON) packet 40 mEq, 40 mEq, Oral, PRN **OR** potassium chloride 10 mEq in 100 mL IVPB, 10 mEq, Intravenous, Q1H PRN, Yovana Alfredo, DO  •  [COMPLETED] Insert peripheral IV, , , Once **AND** sodium chloride 0.9 % flush 10 mL, 10 mL, Intravenous, PRN, Lynn Alfredon A, DO  •  sodium chloride 0.9 % flush 10 mL, 10 mL, Intravenous, Q12H, Yovana Alfredo A, DO, 10 mL at 20 0850  •  sodium chloride 0.9 % flush 10 mL, 10 mL, Intravenous, PRN, Yovana Alfredo A, DO  •  tamsulosin (FLOMAX) 24 hr capsule 0.4 mg, 0.4 mg, Oral, Daily, Yovana Alfredo DO, 0.4 mg at 20 0848  •  Vitamin B1 tablet 100 mg, 100 mg, Oral, Daily, Yovana Alfredo DO, 100 mg at 20 0849    Assessment/Plan     Active Problems:    Ventral hernia with obstruction and without gangrene      Postop day 1 repair of incisional hernia with incarceration and bowel obstruction    Clear liquid diet until bowel function  Start bowel regimen  Start DVT chemoprophylaxis  Antibiotics will  today  Increase activity    This note was created using Dragon Voice Recognition software.    Yo Montanez,  MD  09/07/20  11:34

## 2020-09-07 NOTE — PLAN OF CARE
Pt resting well. Pain controlled on PO pain medication. Dressing dry and intact. Pt tolerating clear liquid diet and will have echo completed today.       Problem: Patient Care Overview  Goal: Plan of Care Review  Outcome: Ongoing (interventions implemented as appropriate)  Goal: Individualization and Mutuality  Outcome: Ongoing (interventions implemented as appropriate)  Goal: Discharge Needs Assessment  Outcome: Ongoing (interventions implemented as appropriate)  Goal: Interprofessional Rounds/Family Conf  Outcome: Ongoing (interventions implemented as appropriate)

## 2020-09-07 NOTE — PLAN OF CARE
Patient up in chair at this time. Pain medication given as ordered and is effective in pain controlled. Ambulated in [place] with SBA

## 2020-09-07 NOTE — H&P
Patient Care Team:  Mani Aceves MD as PCP - General (Family Medicine)    Chief complaint abdominal pain    Subjective     Patient has had a reducible hernia for about the past year.  Yesterday, he became concerned when he was unable to reduce it and he started having some abdominal cramping, so he went to the emergency room. CT of the abdomen and pelvis showed a ventral abdominal wall hernia just above the umbilicus with a loop of small bowel prolapsed through the hernia defect and evidence of small bowel obstruction secondary to the hernia.  The CT also showed cholelithiasis and sigmoid diverticulosis without diverticulitis.  Surgical consult was obtained and the patient had surgery yesterday for an incarcerated incisional hernia with obstruction.  He is having some soreness and pain around the abdominal incision especially when he tries to cough or take a deep breath.  He does have an abdominal binder on and is attempting to splint his abdomen with pillow as needed.  He is also using his incentive spirometer at bedside.  He also mentions that he had an episode of tachycardia yesterday in the emergency room.  An EKG showed SVT and he was given a Cardizem bolus which converted him back to normal sinus rhythm.  Patient has never felt palpitations in the past.  He does report that he gets dizzy sometimes when he is going from sitting to standing or when he is playing golf and he bends over and stands back up.  The feeling is a spinning sensation.  He is wondering if one of his medications might be causing this.  He is planning to come in to the office to discuss this once he recovers from the surgery.      Review of Systems   Constitutional: Negative for fatigue and fever.   Respiratory: Negative for cough, shortness of breath and wheezing.    Cardiovascular: Negative for chest pain and palpitations.   Gastrointestinal: Positive for abdominal pain. Negative for constipation, diarrhea, nausea and vomiting.    Neurological: Positive for dizziness. Negative for weakness.        Past Medical History:   Diagnosis Date   • Arthritis    • Cancer (CMS/HCC)     basil ca on nose   • Elevated cholesterol    • Essential hypertension 2020   • GERD (gastroesophageal reflux disease)      Past Surgical History:   Procedure Laterality Date   • APPENDECTOMY     • COLONOSCOPY     • ENDOSCOPY       Family History   Problem Relation Age of Onset   • Heart disease Father    • Cancer Brother      Social History     Tobacco Use   • Smoking status: Former Smoker     Last attempt to quit: 1976     Years since quittin.0   Substance Use Topics   • Alcohol use: Yes     Alcohol/week: 2.0 standard drinks     Types: 2 Cans of beer per week   • Drug use: Never     Medications Prior to Admission   Medication Sig Dispense Refill Last Dose   • amLODIPine (NORVASC) 5 MG tablet Take 1 tablet by mouth twice daily 180 tablet 0 2020 at Unknown time   • aspirin 81 MG chewable tablet Chew 81 mg Daily.   2020 at Unknown time   • calcium acetate (PHOS BINDER,) 667 MG capsule capsule Take 1,334 mg by mouth 2 (two) times a day.   2020 at Unknown time   • cholecalciferol (VITAMIN D3) 10 MCG (400 UNIT) tablet Take 800 Units by mouth Daily.   2020 at Unknown time   • losartan (COZAAR) 100 MG tablet Take 100 mg by mouth Daily.      • Omega-3 Fatty Acids (FISH OIL) 1000 MG capsule capsule Take 1,000 mg by mouth Daily With Breakfast.   2020 at Unknown time   • omeprazole (priLOSEC) 20 MG capsule Take 20 mg by mouth Daily.   2020 at Unknown time   • sulindac (CLINORIL) 150 MG tablet Take 150 mg by mouth 2 (Two) Times a Day.   2020 at Unknown time   • tamsulosin (FLOMAX) 0.4 MG capsule 24 hr capsule Take 1 capsule by mouth Daily.   2020 at Unknown time   • thiamine (VITAMIN B-1) 100 MG tablet Take 100 mg by mouth Daily.   2020 at Unknown time     Allergies:  Patient has no known allergies.    Objective      Vital  Signs  Temp:  [97.5 °F (36.4 °C)-98.9 °F (37.2 °C)] 98.5 °F (36.9 °C)  Heart Rate:  [] 82  Resp:  [15-22] 20  BP: (124-164)/(70-87) 164/83    Physical Exam   Constitutional: He is oriented to person, place, and time. He appears well-developed and well-nourished.   HENT:   Head: Normocephalic and atraumatic.   Cardiovascular: Normal rate, regular rhythm and normal heart sounds.   Pulmonary/Chest: Effort normal and breath sounds normal.   Abdominal: Soft. Bowel sounds are normal.   Neurological: He is alert and oriented to person, place, and time.   Skin: Skin is warm and dry.   Psychiatric: He has a normal mood and affect. His behavior is normal. Judgment and thought content normal.       Results Review:  Lab Results (last 24 hours)     Procedure Component Value Units Date/Time    BUN [[id number]]  (Abnormal) Collected:  09/07/20 0323    Specimen:  Blood Updated:  09/07/20 0745     BUN 26 mg/dL     Basic Metabolic Panel [[id number]]  (Abnormal) Collected:  09/07/20 0323    Specimen:  Blood Updated:  09/07/20 0503     Glucose 133 mg/dL      BUN --     Comment: Testing performed by alternate method        Creatinine 1.15 mg/dL      Sodium 140 mmol/L      Potassium 4.3 mmol/L      Chloride 106 mmol/L      CO2 26.0 mmol/L      Calcium 7.8 mg/dL      eGFR Non African Amer 63 mL/min/1.73      BUN/Creatinine Ratio --     Comment: Testing not performed        Anion Gap 8.0 mmol/L     Narrative:       GFR Normal >60  Chronic Kidney Disease <60  Kidney Failure <15      Magnesium [[id number]]  (Normal) Collected:  09/07/20 0323    Specimen:  Blood Updated:  09/07/20 0503     Magnesium 2.2 mg/dL     CBC & Differential [[id number]] Collected:  09/07/20 0323    Specimen:  Blood Updated:  09/07/20 0443    Narrative:       The following orders were created for panel order CBC & Differential.  Procedure                               Abnormality         Status                     ---------                               -----------          ------                     CBC Auto Differential[[id number]]        Abnormal            Final result                 Please view results for these tests on the individual orders.    CBC Auto Differential [[id number]]  (Abnormal) Collected:  09/07/20 0323    Specimen:  Blood Updated:  09/07/20 0443     WBC 8.40 10*3/mm3      RBC 4.03 10*6/mm3      Hemoglobin 13.1 g/dL      Comment: Result checked         Hematocrit 37.7 %      MCV 93.6 fL      MCH 32.6 pg      MCHC 34.8 g/dL      RDW 13.0 %      RDW-SD 42.4 fl      MPV 7.3 fL      Platelets 270 10*3/mm3      Neutrophil % 84.3 %      Lymphocyte % 5.7 %      Monocyte % 9.9 %      Eosinophil % 0.0 %      Basophil % 0.1 %      Neutrophils, Absolute 7.10 10*3/mm3      Lymphocytes, Absolute 0.50 10*3/mm3      Monocytes, Absolute 0.80 10*3/mm3      Eosinophils, Absolute 0.00 10*3/mm3      Basophils, Absolute 0.00 10*3/mm3      nRBC 0.1 /100 WBC     Hemoglobin A1c [[id number]]  (Normal) Collected:  09/06/20 1008    Specimen:  Blood Updated:  09/06/20 1552     Hemoglobin A1C 5.3 %     Narrative:       Hemoglobin A1C Reference Range:    <5.7 %        Normal  5.7-6.4 %     Increased risk for diabetes  > 6.4 %        Diabetes       These guidelines have been recommended by the American Diabetic Association for Hgb A1c.      The following 2010 guidelines have been recommended by the American Diabetes Association for Hemoglobin A1c.    HBA1c 5.7-6.4% Increased risk for future diabetes (pre-diabetes)  HBA1c     >6.4% Diabetes           Imaging Results (Last 24 Hours)     ** No results found for the last 24 hours. **           I reviewed the patient's new clinical results.      Assessment/Plan       Ventral hernia with obstruction and without gangrene    SVT (supraventricular tachycardia) (CMS/HCC)    Essential hypertension          Plan:   (Add stool softener.  PT and OT evaluations are pending.  Continue postoperative care.  Appreciate cardiology input.  ).       I discussed  the patients findings and my recommendations with patient and primary care team    ABDOULAYE Emmanuel  09/07/20  12:34

## 2020-09-08 PROBLEM — F32.A DEPRESSION: Status: ACTIVE | Noted: 2020-09-08

## 2020-09-08 PROBLEM — I10 HYPERTENSION: Status: ACTIVE | Noted: 2020-09-08

## 2020-09-08 PROBLEM — F41.9 ANXIETY DISORDER: Status: ACTIVE | Noted: 2020-09-08

## 2020-09-08 LAB
ALBUMIN SERPL-MCNC: 3.3 G/DL (ref 3.5–5.2)
ALBUMIN/GLOB SERPL: 1.3 G/DL
ALP SERPL-CCNC: 75 U/L (ref 39–117)
ALT SERPL W P-5'-P-CCNC: 11 U/L (ref 1–41)
ANION GAP SERPL CALCULATED.3IONS-SCNC: 8 MMOL/L (ref 5–15)
AST SERPL-CCNC: 22 U/L (ref 1–40)
BASOPHILS # BLD AUTO: 0 10*3/MM3 (ref 0–0.2)
BASOPHILS NFR BLD AUTO: 0.2 % (ref 0–1.5)
BILIRUB SERPL-MCNC: 0.7 MG/DL (ref 0–1.2)
BUN SERPL-MCNC: 17 MG/DL (ref 8–23)
BUN SERPL-MCNC: ABNORMAL MG/DL
BUN/CREAT SERPL: ABNORMAL
CALCIUM SPEC-SCNC: 8.3 MG/DL (ref 8.6–10.5)
CHLORIDE SERPL-SCNC: 101 MMOL/L (ref 98–107)
CO2 SERPL-SCNC: 28 MMOL/L (ref 22–29)
CREAT SERPL-MCNC: 0.94 MG/DL (ref 0.76–1.27)
DEPRECATED RDW RBC AUTO: 42.4 FL (ref 37–54)
EOSINOPHIL # BLD AUTO: 0 10*3/MM3 (ref 0–0.4)
EOSINOPHIL NFR BLD AUTO: 0.3 % (ref 0.3–6.2)
ERYTHROCYTE [DISTWIDTH] IN BLOOD BY AUTOMATED COUNT: 13.1 % (ref 12.3–15.4)
GFR SERPL CREATININE-BSD FRML MDRD: 79 ML/MIN/1.73
GLOBULIN UR ELPH-MCNC: 2.5 GM/DL
GLUCOSE SERPL-MCNC: 114 MG/DL (ref 65–99)
HCT VFR BLD AUTO: 38.2 % (ref 37.5–51)
HGB BLD-MCNC: 13.1 G/DL (ref 13–17.7)
LYMPHOCYTES # BLD AUTO: 0.9 10*3/MM3 (ref 0.7–3.1)
LYMPHOCYTES NFR BLD AUTO: 10.7 % (ref 19.6–45.3)
MCH RBC QN AUTO: 31.9 PG (ref 26.6–33)
MCHC RBC AUTO-ENTMCNC: 34.4 G/DL (ref 31.5–35.7)
MCV RBC AUTO: 92.9 FL (ref 79–97)
MONOCYTES # BLD AUTO: 0.7 10*3/MM3 (ref 0.1–0.9)
MONOCYTES NFR BLD AUTO: 9.3 % (ref 5–12)
NEUTROPHILS NFR BLD AUTO: 6.4 10*3/MM3 (ref 1.7–7)
NEUTROPHILS NFR BLD AUTO: 79.5 % (ref 42.7–76)
NRBC BLD AUTO-RTO: 0 /100 WBC (ref 0–0.2)
PLATELET # BLD AUTO: 246 10*3/MM3 (ref 140–450)
PMV BLD AUTO: 7.2 FL (ref 6–12)
POTASSIUM SERPL-SCNC: 3.9 MMOL/L (ref 3.5–5.2)
PROT SERPL-MCNC: 5.8 G/DL (ref 6–8.5)
RBC # BLD AUTO: 4.12 10*6/MM3 (ref 4.14–5.8)
SODIUM SERPL-SCNC: 137 MMOL/L (ref 136–145)
WBC # BLD AUTO: 8.1 10*3/MM3 (ref 3.4–10.8)

## 2020-09-08 PROCEDURE — 97165 OT EVAL LOW COMPLEX 30 MIN: CPT

## 2020-09-08 PROCEDURE — 25010000002 ENOXAPARIN PER 10 MG: Performed by: SURGERY

## 2020-09-08 PROCEDURE — 99024 POSTOP FOLLOW-UP VISIT: CPT | Performed by: SURGERY

## 2020-09-08 PROCEDURE — 97161 PT EVAL LOW COMPLEX 20 MIN: CPT

## 2020-09-08 PROCEDURE — 80053 COMPREHEN METABOLIC PANEL: CPT | Performed by: NURSE PRACTITIONER

## 2020-09-08 PROCEDURE — 85025 COMPLETE CBC W/AUTO DIFF WBC: CPT | Performed by: FAMILY MEDICINE

## 2020-09-08 PROCEDURE — 99232 SBSQ HOSP IP/OBS MODERATE 35: CPT | Performed by: INTERNAL MEDICINE

## 2020-09-08 RX ORDER — POLYETHYLENE GLYCOL 3350 17 G/17G
17 POWDER, FOR SOLUTION ORAL 2 TIMES DAILY
Status: DISCONTINUED | OUTPATIENT
Start: 2020-09-08 | End: 2020-09-09 | Stop reason: HOSPADM

## 2020-09-08 RX ADMIN — Medication 100 MG: at 08:09

## 2020-09-08 RX ADMIN — ENOXAPARIN SODIUM 40 MG: 40 INJECTION SUBCUTANEOUS at 15:27

## 2020-09-08 RX ADMIN — PANTOPRAZOLE SODIUM 40 MG: 40 TABLET, DELAYED RELEASE ORAL at 06:05

## 2020-09-08 RX ADMIN — POLYETHYLENE GLYCOL 3350 17 G: 17 POWDER, FOR SOLUTION ORAL at 20:31

## 2020-09-08 RX ADMIN — METOPROLOL TARTRATE 12.5 MG: 25 TABLET, FILM COATED ORAL at 08:09

## 2020-09-08 RX ADMIN — METOPROLOL TARTRATE 12.5 MG: 25 TABLET, FILM COATED ORAL at 20:30

## 2020-09-08 RX ADMIN — TAMSULOSIN HYDROCHLORIDE 0.4 MG: 0.4 CAPSULE ORAL at 08:09

## 2020-09-08 RX ADMIN — DOCUSATE SODIUM 100 MG: 100 CAPSULE, LIQUID FILLED ORAL at 20:31

## 2020-09-08 RX ADMIN — AMLODIPINE BESYLATE 5 MG: 5 TABLET ORAL at 08:09

## 2020-09-08 RX ADMIN — SODIUM CHLORIDE, SODIUM LACTATE, POTASSIUM CHLORIDE, AND CALCIUM CHLORIDE 50 ML/HR: 600; 310; 30; 20 INJECTION, SOLUTION INTRAVENOUS at 20:31

## 2020-09-08 RX ADMIN — Medication 10 ML: at 08:09

## 2020-09-08 RX ADMIN — CALCIUM ACETATE 1334 MG: 667 CAPSULE ORAL at 20:30

## 2020-09-08 RX ADMIN — DOCUSATE SODIUM 100 MG: 100 CAPSULE, LIQUID FILLED ORAL at 08:09

## 2020-09-08 RX ADMIN — CALCIUM ACETATE 1334 MG: 667 CAPSULE ORAL at 08:09

## 2020-09-08 RX ADMIN — LOSARTAN POTASSIUM 100 MG: 50 TABLET, FILM COATED ORAL at 08:09

## 2020-09-08 NOTE — PLAN OF CARE
Problem: Patient Care Overview  Goal: Plan of Care Review  Outcome: Ongoing (interventions implemented as appropriate)  Flowsheets  Taken 9/8/2020 1400  Progress: no change  Plan of Care Reviewed With: patient  Taken 9/8/2020 1429  Outcome Summary: Pt. is 71 y/o male s/p hernia repair POD #2. Pt. reports I/ADL independence prior to admit, appears close to baseline this date w/ donning/doffing socks w/o assist and supervision for functional transfers. Pt. progress limited secondary to decreased dynamic standing balance, will follow up w/ pt. 1-3x per week at [facility]. Recommend d/c home w/ assist.  Note:   PPE: gloves, mask, face shield

## 2020-09-08 NOTE — PLAN OF CARE
Problem: Patient Care Overview  Goal: Plan of Care Review  Outcome: Ongoing (interventions implemented as appropriate)  Flowsheets (Taken 9/8/2020 1434)  Outcome Summary: Pt is 71 YO M s/p hernia repair POD 2. Pt typically is very indpendent, lives at home with spouse, drives, and ambulates without AD with no recent falls. Pt demonstrated good functional mobilty this date requiring no physical assistance for bed mobilty transfers or ambulation. Pt demonstrates good understanding of log roll technique and reports some pain in abdomen with coming to standing. Pt ambulated 250 feet and demonstrated good dynamic balance. Pt appears to be at functional baseline and will no longer require skilled PT services. Recommendation is pt to return home with family assist. PPE worn includes gloves and mask with shield.

## 2020-09-08 NOTE — PROGRESS NOTES
General Surgery Progress Note     LOS: 2 days   Patient Care Team:  Mani Aceves MD as PCP - General (Family Medicine)    Subjective     Interval History:   Postop day 2 ventral hernia repair for incarcerated incisional hernia with signs of obstruction    Tolerating some clear liquid diet with starting to have some flatus.  Pain is significantly improved.  Patient is ambulatory.  Objective     Vital Signs  Temp:  [97.9 °F (36.6 °C)-99.5 °F (37.5 °C)] 97.9 °F (36.6 °C)  Heart Rate:  [70-87] 70  Resp:  [16-18] 16  BP: (137-157)/(72-77) 149/72    Physical Exam   Constitutional: He appears well-developed and well-nourished.   HENT:   Head: Normocephalic and atraumatic.   Pulmonary/Chest: Effort normal.   Abdominal:   Soft and obese appropriately tender incision is healing with some minimal drainage from the midline no evidence of infection   Musculoskeletal: He exhibits no edema or tenderness.   Neurological: He is alert. No cranial nerve deficit.   Skin: Skin is warm and dry.   Psychiatric: He has a normal mood and affect. His behavior is normal.          Results Review:    Lab Results (last 24 hours)     Procedure Component Value Units Date/Time    BUN [[id number]]  (Normal) Collected:  09/08/20 0234    Specimen:  Blood Updated:  09/08/20 0717     BUN 17 mg/dL     Comprehensive Metabolic Panel [[id number]]  (Abnormal) Collected:  09/08/20 0234    Specimen:  Blood Updated:  09/08/20 0345     Glucose 114 mg/dL      BUN --     Comment: Testing performed by alternate method        Creatinine 0.94 mg/dL      Sodium 137 mmol/L      Potassium 3.9 mmol/L      Chloride 101 mmol/L      CO2 28.0 mmol/L      Calcium 8.3 mg/dL      Total Protein 5.8 g/dL      Albumin 3.30 g/dL      ALT (SGPT) 11 U/L      AST (SGOT) 22 U/L      Alkaline Phosphatase 75 U/L      Total Bilirubin 0.7 mg/dL      eGFR Non African Amer 79 mL/min/1.73      Globulin 2.5 gm/dL      A/G Ratio 1.3 g/dL      BUN/Creatinine Ratio --     Comment: Testing not  performed        Anion Gap 8.0 mmol/L     Narrative:       GFR Normal >60  Chronic Kidney Disease <60  Kidney Failure <15      CBC & Differential [[id number]] Collected:  09/08/20 0234    Specimen:  Blood Updated:  09/08/20 0323    Narrative:       The following orders were created for panel order CBC & Differential.  Procedure                               Abnormality         Status                     ---------                               -----------         ------                     CBC Auto Differential[[id number]]        Abnormal            Final result                 Please view results for these tests on the individual orders.    CBC Auto Differential [[id number]]  (Abnormal) Collected:  09/08/20 0234    Specimen:  Blood Updated:  09/08/20 0323     WBC 8.10 10*3/mm3      RBC 4.12 10*6/mm3      Hemoglobin 13.1 g/dL      Hematocrit 38.2 %      MCV 92.9 fL      MCH 31.9 pg      MCHC 34.4 g/dL      RDW 13.1 %      RDW-SD 42.4 fl      MPV 7.2 fL      Platelets 246 10*3/mm3      Neutrophil % 79.5 %      Lymphocyte % 10.7 %      Monocyte % 9.3 %      Eosinophil % 0.3 %      Basophil % 0.2 %      Neutrophils, Absolute 6.40 10*3/mm3      Lymphocytes, Absolute 0.90 10*3/mm3      Monocytes, Absolute 0.70 10*3/mm3      Eosinophils, Absolute 0.00 10*3/mm3      Basophils, Absolute 0.00 10*3/mm3      nRBC 0.0 /100 WBC         Imaging Results (Last 24 Hours)     ** No results found for the last 24 hours. **         I reviewed the patient's new clinical results.    Medication Review:    Current Facility-Administered Medications:   •  acetaminophen (TYLENOL) tablet 650 mg, 650 mg, Oral, Q4H PRN **OR** acetaminophen (TYLENOL) 160 MG/5ML solution 650 mg, 650 mg, Oral, Q4H PRN **OR** acetaminophen (TYLENOL) suppository 650 mg, 650 mg, Rectal, Q4H PRN, Alfredo, Yovana A, DO  •  amLODIPine (NORVASC) tablet 5 mg, 5 mg, Oral, Q24H, Alfredo, Yovana A, DO, 5 mg at 09/08/20 0809  •  bisacodyl (DULCOLAX) suppository 10 mg, 10 mg, Rectal,  Daily PRN, Yovana Alfredo DO  •  calcium acetate (PHOS BINDER)) capsule 1,334 mg, 1,334 mg, Oral, BID, Yovana Alfredo DO, 1,334 mg at 09/08/20 0809  •  docusate sodium (COLACE) capsule 100 mg, 100 mg, Oral, BID, Lela Melo APRN, 100 mg at 09/08/20 0809  •  enoxaparin (LOVENOX) syringe 40 mg, 40 mg, Subcutaneous, Q24H, Yo Montanez MD, 40 mg at 09/07/20 1543  •  HYDROcodone-acetaminophen (NORCO) 5-325 MG per tablet 1 tablet, 1 tablet, Oral, Q4H PRN, Yovana Alfredo DO, 1 tablet at 09/07/20 1453  •  lactated ringers infusion, 50 mL/hr, Intravenous, Continuous, Yo Montanez MD, Last Rate: 125 mL/hr at 09/07/20 1543, 125 mL/hr at 09/07/20 1543  •  losartan (COZAAR) tablet 100 mg, 100 mg, Oral, Daily, Yovana Alfredo DO, 100 mg at 09/08/20 0809  •  magnesium hydroxide (MILK OF MAGNESIA) suspension 2400 mg/10mL 10 mL, 10 mL, Oral, Daily PRN, Yovana Alfredo,   •  Magnesium Sulfate 2 gram Bolus, followed by 8 gram infusion (total Mg dose 10 grams)- Mg less than or equal to 1mg/dL, 2 g, Intravenous, PRN **OR** Magnesium Sulfate 2 gram / 50mL Infusion (GIVE X 3 BAGS TO EQUAL 6GM TOTAL DOSE) - Mg 1.1 - 1.5 mg/dl, 2 g, Intravenous, PRN **OR** Magnesium Sulfate 4 gram infusion- Mg 1.6-1.9 mg/dL, 4 g, Intravenous, PRN, Yovana Alfredo,   •  melatonin tablet 5 mg, 5 mg, Oral, Nightly PRN, Yovana Alfredo,   •  metoprolol tartrate (LOPRESSOR) tablet 12.5 mg, 12.5 mg, Oral, Q12H, Yovana Alfredo DO, 12.5 mg at 09/08/20 0809  •  Morphine sulfate (PF) injection 4 mg, 4 mg, Intravenous, Q2H PRN **AND** naloxone (NARCAN) injection 0.4 mg, 0.4 mg, Intravenous, Q5 Min PRN, Yovana Alfredo DO  •  nitroglycerin (NITROSTAT) SL tablet 0.4 mg, 0.4 mg, Sublingual, Q5 Min PRN, Yovana Alfredo DO  •  ondansetron (ZOFRAN) tablet 4 mg, 4 mg, Oral, Q6H PRN **OR** ondansetron (ZOFRAN) injection 4 mg, 4 mg, Intravenous, Q6H PRN, Yovana Alfredo DO, 4 mg at 09/06/20 2240  •  pantoprazole (PROTONIX) EC tablet 40 mg, 40 mg, Oral, QAM,  Juni Yovana A, DO, 40 mg at 09/08/20 0605  •  polyethylene glycol (MIRALAX) packet 17 g, 17 g, Oral, BID, Yo Montanez MD  •  potassium chloride (K-DUR,KLOR-CON) CR tablet 40 mEq, 40 mEq, Oral, PRN **OR** potassium chloride (KLOR-CON) packet 40 mEq, 40 mEq, Oral, PRN **OR** potassium chloride 10 mEq in 100 mL IVPB, 10 mEq, Intravenous, Q1H PRN, Lynn Alfredon A, DO  •  [COMPLETED] Insert peripheral IV, , , Once **AND** sodium chloride 0.9 % flush 10 mL, 10 mL, Intravenous, PRN, Alfredo, Yovana A, DO  •  sodium chloride 0.9 % flush 10 mL, 10 mL, Intravenous, Q12H, Alfredo, Yovana A, DO, 10 mL at 09/08/20 0809  •  sodium chloride 0.9 % flush 10 mL, 10 mL, Intravenous, PRN, Juni, Yovana A, DO  •  tamsulosin (FLOMAX) 24 hr capsule 0.4 mg, 0.4 mg, Oral, Daily, Alfredo, Yovana A, DO, 0.4 mg at 09/08/20 0809  •  Vitamin B1 tablet 100 mg, 100 mg, Oral, Daily, Alfredo, Yovana A, DO, 100 mg at 09/08/20 0809    Assessment/Plan     Active Problems:    Ventral hernia with obstruction and without gangrene    SVT (supraventricular tachycardia) (CMS/HCC)    Essential hypertension      Postop day 2 repair of incisional hernia with incarceration and bowel obstruction    Low residue diet  MiraLAX  Off antibiotics  On DVT chemoprophylaxis  If bowel movement and tolerating a diet possibly home tomorrow    This note was created using Dragon Voice Recognition software.    Yo Montanez MD  09/08/20  14:11

## 2020-09-08 NOTE — THERAPY EVALUATION
Patient Name: Florencio Rangel  : 1950    MRN: [id number]                              Today's Date: 2020       Admit Date: 2020    Visit Dx:     ICD-10-CM ICD-9-CM   1. Ventral hernia with obstruction and without gangrene K43.9 553.20   2. SVT (supraventricular tachycardia) (CMS/HCC) I47.1 427.89     Patient Active Problem List   Diagnosis   • Ventral hernia with obstruction and without gangrene   • SVT (supraventricular tachycardia) (CMS/HCC)   • Essential hypertension   • Ankylosing spondylitis (CMS/HCC)   • Anxiety disorder   • Depression   • Other cervical disc degeneration, unspecified cervical region   • Spinal stenosis of cervical region   • Hypertension     Past Medical History:   Diagnosis Date   • Arthritis    • Cancer (CMS/HCC)     basil ca on nose   • Elevated cholesterol    • Essential hypertension 2020   • GERD (gastroesophageal reflux disease)      Past Surgical History:   Procedure Laterality Date   • APPENDECTOMY     • COLONOSCOPY     • ENDOSCOPY       General Information     Row Name 20 1433          General Information    Prior Level of Function  independent:;ADL's;community mobility;driving  -     Row Name 20 1433          Relationship/Environment    Lives With  spouse  -     Row Name 20 1433          Resource/Environmental Concerns    Current Living Arrangements  home/apartment/condo  -     Row Name 20 1433          Home Main Entrance    Number of Stairs, Main Entrance  none  -     Row Name 20 1433          Stairs Within Home, Primary    Stairs, Within Home, Primary  Basement, but has a chair lift to take him into basement  -     Row Name 20 1433          Cognitive Assessment/Intervention- PT/OT    Orientation Status (Cognition)  oriented x 4  -EL       User Key  (r) = Recorded By, (t) = Taken By, (c) = Cosigned By    Initials Name Provider Type    Tod Ureña PT Physical Therapist        Mobility     Row Name 20 1435           Bed Mobility Assessment/Treatment    Bed Mobility Assessment/Treatment  supine-sit  -EL     Supine-Sit False Pass (Bed Mobility)  conditional independence  -     Comment (Bed Mobility)  Demonstrated good understanding of log roll technique  -     Row Name 09/08/20 1435          Sit-Stand Transfer    Sit-Stand False Pass (Transfers)  supervision  -     Row Name 09/08/20 1435          Gait/Stairs Assessment/Training    Gait/Stairs Assessment/Training  gait/ambulation independence;distance ambulated  -EL     False Pass Level (Gait)  supervision  -     Distance in Feet (Gait)  250  -     Pattern (Gait)  swing-through  -       User Key  (r) = Recorded By, (t) = Taken By, (c) = Cosigned By    Initials Name Provider Type    Tod Ureña, ANTONIO Physical Therapist        Obj/Interventions     Row Name 09/08/20 1436          General ROM    GENERAL ROM COMMENTS  BLE WNL  -[facility] Name 09/08/20 1436          MMT (Manual Muscle Testing)    General MMT Comments  BLE 5/5 gross  -[facility] Name 09/08/20 1436          Static Sitting Balance    Level of False Pass (Unsupported Sitting, Static Balance)  conditional independence  -[facility] Name 09/08/20 1436          Dynamic Sitting Balance    Level of False Pass, Reaches Outside Midline (Sitting, Dynamic Balance)  conditional independence  -     Row Name 09/08/20 1436          Static Standing Balance    Level of False Pass (Supported Standing, Static Balance)  supervision  -     Row Name 09/08/20 1436          Dynamic Standing Balance    Level of False Pass, Reaches Outside Midline (Standing, Dynamic Balance)  supervision  -     Row Name 09/08/20 1436          Sensory Assessment/Intervention    Sensory General Assessment  no sensation deficits identified  -       User Key  (r) = Recorded By, (t) = Taken By, (c) = Cosigned By    Initials Name Provider Type    Tod Ureña PT Physical Therapist        Goals/Plan    No documentation.        Clinical Impression     [facility] Name 09/08/20 1437          Pain Assessment    Additional Documentation  Pain Scale: FACES Pre/Post-Treatment (Group)  -[facility] Name 09/08/20 1437          Pain Scale: FACES Pre/Post-Treatment    Pain: FACES Scale, Pretreatment  0-->no hurt  -EL     Pain: FACES Scale, Post-Treatment  2-->hurts little bit  -EL     Row Name 09/08/20 1437          Plan of Care Review    Plan of Care Reviewed With  patient  -EL     Outcome Summary  Pt is 71 YO M s/p hernia repair POD 2. Pt typically is very indpendent, lives at home with spouse, drives, and ambulates without AD with no recent falls. Pt demonstrated good functional mobilty this date requiring no physical assistance for bed mobilty transfers or ambulation. Pt demonstrates good understanding of log roll technique and reports some pain in abdomen with coming to standing. Pt ambulated 250 feet and demonstrated good dynamic balance. Pt appears to be at functional baseline and will no longer require skilled PT services. Recommendation is pt to return home with family assist. PPE worn includes gloves and mask with shield.   -     Row Name 09/08/20 [facility]7          Physical Therapy Clinical Impression    Criteria for Skilled Interventions Met (PT Clinical Impression)  yes  -[facility] Name 09/08/20 1437          Vital Signs    O2 Delivery Pre Treatment  room air  -EL     O2 Delivery Intra Treatment  room air  -EL     O2 Delivery Post Treatment  room air  -EL     Pre Patient Position  Supine  -EL     Intra Patient Position  Standing  -EL     Post Patient Position  Supine  -EL     [facility] Name 09/08/20 1437          Positioning and Restraints    Pre-Treatment Position  in bed  -EL     Post Treatment Position  bed  -EL     In Chair  notified nsg;call light within reach;encouraged to call for assist  -EL       User Key  (r) = Recorded By, (t) = Taken By, (c) = Cosigned By    Initials Name Provider Type    Tod Ureña, PT Physical Therapist         Outcome Measures    No documentation.       Physical Therapy Education                 Title: PT OT SLP Therapies (In Progress)     Topic: Physical Therapy (In Progress)     Point: Mobility training (Done)     Description:   Instruct learner(s) on safety and technique for assisting patient out of bed, chair or wheelchair.  Instruct in the proper use of assistive devices, such as walker, crutches, cane or brace.              Patient Friendly Description:   It's important to get you on your feet again, but we need to do so in a way that is safe for you. Falling has serious consequences, and your personal safety is the most important thing of all.        When it's time to get out of bed, one of us or a family member will sit next to you on the bed to give you support.     If your doctor or nurse tells you to use a walker, crutches, a cane, or a brace, be sure you use it every time you get out of bed, even if you think you don't need it.    Learning Progress Summary           Patient Acceptance, E,TB, VU by  at 9/8/2020 1441                   Point: Home exercise program (Not Started)     Description:   Instruct learner(s) on appropriate technique for monitoring, assisting and/or progressing patient with therapeutic exercises and activities.              Learner Progress:   Not documented in this visit.          Point: Body mechanics (Not Started)     Description:   Instruct learner(s) on proper positioning and spine alignment for patient and/or caregiver during mobility tasks and/or exercises.              Learner Progress:   Not documented in this visit.          Point: Precautions (Done)     Description:   Instruct learner(s) on prescribed precautions during mobility and gait tasks              Learning Progress Summary           Patient Acceptance, E,TB, VU by  at 9/8/2020 1441                               User Key     Initials Effective Dates Name Provider Type Discipline     06/23/20 -  Tod Trent, PT  Physical Therapist PT              PT Recommendation and Plan     Plan of Care Reviewed With: patient  Outcome Summary: Pt is 71 YO M s/p hernia repair POD 2. Pt typically is very indpendent, lives at home with spouse, drives, and ambulates without AD with no recent falls. Pt demonstrated good functional mobilty this date requiring no physical assistance for bed mobilty transfers or ambulation. Pt demonstrates good understanding of log roll technique and reports some pain in abdomen with coming to standing. Pt ambulated 250 feet and demonstrated good dynamic balance. Pt appears to be at functional baseline and will no longer require skilled PT services. Recommendation is pt to return home with family assist. PPE worn includes gloves and mask with shield.      Time Calculation:   PT Charges     Row Name 09/08/20 1441             Time Calculation    Start Time  1323  -EL      Stop Time  1335  -EL      Time Calculation (min)  12 min  -EL      PT Received On  09/08/20  -EL        User Key  (r) = Recorded By, (t) = Taken By, (c) = Cosigned By    Initials Name Provider Type    EL Tod Trent, PT Physical Therapist        Therapy Charges for Today     Code Description Service Date Service Provider Modifiers Qty    [id number] HC PT EVAL LOW COMPLEXITY 3 9/8/2020 Tod Trent, PT GP 1               Tod Trent PT  9/8/2020

## 2020-09-08 NOTE — THERAPY EVALUATION
Acute Care - Occupational Therapy Initial Evaluation   [facility]     Patient Name: Florencio Rangel  : 1950  MRN: [id number]  Today's Date: 2020             Admit Date: 2020       ICD-10-CM ICD-9-CM   1. Ventral hernia with obstruction and without gangrene K43.9 553.20   2. SVT (supraventricular tachycardia) (CMS/HCC) I47.1 427.89     Patient Active Problem List   Diagnosis   • Ventral hernia with obstruction and without gangrene   • SVT (supraventricular tachycardia) (CMS/HCC)   • Essential hypertension   • Ankylosing spondylitis (CMS/HCC)   • Anxiety disorder   • Depression   • Other cervical disc degeneration, unspecified cervical region   • Spinal stenosis of cervical region   • Hypertension     Past Medical History:   Diagnosis Date   • Arthritis    • Cancer (CMS/HCC)     basil ca on nose   • Elevated cholesterol    • Essential hypertension 2020   • GERD (gastroesophageal reflux disease)      Past Surgical History:   Procedure Laterality Date   • APPENDECTOMY     • COLONOSCOPY     • ENDOSCOPY            OT ASSESSMENT FLOWSHEET (last 12 hours)      Occupational Therapy Evaluation     Row Name 20 1400                   OT Evaluation Time/Intention    Subjective Information  no complaints  -MP        Document Type  evaluation  -MP        Mode of Treatment  individual therapy  -MP        Patient Effort  good  -MP           General Information    Patient Profile Reviewed?  yes  -MP        Patient Observations  alert;cooperative;agree to therapy  -MP        General Observations of Patient  Pt. seated in armchair  -MP        Prior Level of Function  independent:;ADL's  -MP           Relationship/Environment    Lives With  spouse  -MP        Family Caregiver if Needed  spouse  -MP           Resource/Environmental Concerns    Current Living Arrangements  home/apartment/condo  -MP        Resource/Environmental Concerns  none  -MP        Transportation Concerns  car, none  -MP           Cognitive  Assessment/Intervention- PT/OT    Orientation Status (Cognition)  oriented x 3  -MP        Follows Commands (Cognition)  WNL  -MP           Transfer Assessment/Treatment    Transfer Assessment/Treatment  sit-stand transfer  -MP           Sit-Stand Transfer    Sit-Stand Dameron (Transfers)  supervision;1 person assist  -MP           ADL Assessment/Intervention    BADL Assessment/Intervention  lower body dressing  -MP           Lower Body Dressing Assessment/Training    Lower Body Dressing Dameron Level  don;doff;socks;independent  -MP        Lower Body Dressing Position  unsupported sitting  -MP        Comment (Lower Body Dressing)  cross leg tech  -MP           General ROM    GENERAL ROM COMMENTS  BUE WFL  -MP           MMT (Manual Muscle Testing)    General MMT Comments  BUE WFL  -MP           Positioning and Restraints    Pre-Treatment Position  sitting in chair/recliner  -MP        Post Treatment Position  chair  -MP        In Chair  call light within reach;encouraged to call for assist;exit alarm on  -MP           Pain Assessment    Additional Documentation  Pain Scale: Word Pre/Post-Treatment (Group)  -MP           Pain Scale: Word Pre/Post-Treatment    Pain: Word Scale, Pretreatment  0 - no pain  -MP        Pain: Word Scale, Post-Treatment  0 - no pain  -MP           Wound 09/06/20 1448 midline abdomen Incision    Wound - Properties Group Date first assessed: 09/06/20  -EW Time first assessed: 1448  -EW Present on Hospital Admission: Y  -EW Orientation: midline  -EW Location: abdomen  -EW Primary Wound Type: Incision  -EW       Plan of Care Review    Plan of Care Reviewed With  patient  -MP        Progress  no change  -MP           Clinical Impression (OT)    Criteria for Skilled Therapeutic Interventions Met (OT Eval)  yes;treatment indicated  -MP        Rehab Potential (OT Eval)  good, to achieve stated therapy goals  -MP        Therapy Frequency (OT Eval)  3 times/wk  -MP        Care Plan Review  (OT)  evaluation/treatment results reviewed  -MP        Anticipated Discharge Disposition (OT)  home with assist  -MP           OT Goals    Bed Mobility Goal Selection (OT)  bed mobility, OT goal 1  -MP        Transfer Goal Selection (OT)  transfer, OT goal 1  -MP        Toileting Goal Selection (OT)  toileting, OT goal 1  -MP           Bed Mobility Goal 1 (OT)    Activity/Assistive Device (Bed Mobility Goal 1, OT)  bed mobility activities, all  -MP        [place] Level/Cues Needed (Bed Mobility Goal 1, OT)  independent  -MP        Time Frame (Bed Mobility Goal 1, OT)  long term goal (LTG);2 weeks  -MP           Transfer Goal 1 (OT)    Activity/Assistive Device (Transfer Goal 1, OT)  transfers, all  -MP        [place] Level/Cues Needed (Transfer Goal 1, OT)  independent  -MP        Time Frame (Transfer Goal 1, OT)  long term goal (LTG);2 weeks  -MP           Toileting Goal 1 (OT)    Activity/Device (Toileting Goal 1, OT)  toileting skills, all  -MP        [place] Level/Cues Needed (Toileting Goal 1, OT)  independent  -MP        Time Frame (Toileting Goal 1, OT)  long term goal (LTG);2 weeks  -MP          User Key  (r) = Recorded By, (t) = Taken By, (c) = Cosigned By    Initials Name Effective Dates    Omid Brian, OT 03/01/19 -     Maria De Jesus Khan RN 03/01/19 -          Occupational Therapy Education                 Title: PT OT SLP Therapies (Not Started)     Topic: Occupational Therapy (Not Started)     Point: ADL training (Not Started)     Description:   Instruct learner(s) on proper safety adaptation and remediation techniques during self care or transfers.   Instruct in proper use of assistive devices.              Learner Progress:   Not documented in this visit.          Point: Home exercise program (Not Started)     Description:   Instruct learner(s) on appropriate technique for monitoring, assisting and/or progressing therapeutic exercises/activities.              Learner Progress:    Not documented in this visit.          Point: Precautions (Not Started)     Description:   Instruct learner(s) on prescribed precautions during self-care and functional transfers.              Learner Progress:   Not documented in this visit.          Point: Body mechanics (Not Started)     Description:   Instruct learner(s) on proper positioning and spine alignment during self-care, functional mobility activities and/or exercises.              Learner Progress:   Not documented in this visit.                              OT Recommendation and Plan  Outcome Summary/Treatment Plan (OT)  Anticipated Discharge Disposition (OT): home with assist  Therapy Frequency (OT Eval): 3 times/wk  Plan of Care Review  Plan of Care Reviewed With: patient  Plan of Care Reviewed With: patient  Outcome Summary: Pt. is 71 y/o male s/p hernia repair POD #2. Pt. reports I/ADL independence prior to admit, appears close to baseline this date w/ donning/doffing socks w/o assist and supervision for functional transfers. Pt. progress limited secondary to decreased dynamic standing balance, will follow up w/ pt. [facility]3x per [facility]. Recommend d/c home w/ assist.        Time Calculation:   Time Calculation- OT     Row Name 09/08/20 1434             Time Calculation- OT    OT Start Time  0935  -MP      OT Stop Time  0950  -      OT Time Calculation (min)  15 min  -MP      Total Timed Code Minutes- OT  0 minute(s)  -MP      OT Received On  09/08/20  -      OT - Next Appointment  09/10/20  -      OT Goal Re-Cert Due Date  09/22/20  -        User Key  (r) = Recorded By, (t) = Taken By, (c) = Cosigned By    Initials Name Provider Type     Omid Bass OT Occupational Therapist        Therapy Charges for Today     Code Description Service Date Service Provider Modifiers Qty    [id number]  OT EVAL LOW COMPLEXITY 3 9/8/2020 Omid Bass OT GO 1               Omid Bass OT  9/8/2020

## 2020-09-08 NOTE — PROGRESS NOTES
LOS: 2 days   Patient Care Team:  Mani Aceves MD as PCP - General (Family Medicine)    Subjective:  Feels better    Objective:   Slightly sore//afebrile      Review of Systems:   Review of Systems   Constitutional: Positive for activity change.   Respiratory: Negative.    Cardiovascular: Negative.    Gastrointestinal: Positive for abdominal pain.       Vital Signs  Temp:  [98.3 °F (36.8 °C)-99.5 °F (37.5 °C)] 98.3 °F (36.8 °C)  Heart Rate:  [70-87] 70  Resp:  [16-20] 17  BP: (133-164)/(73-83) 149/77    Physical Exam:  Physical Exam   Constitutional: He is oriented to person, place, and time. He appears well-developed and well-nourished.   Eyes: Pupils are equal, round, and reactive to light. EOM are normal.   Cardiovascular: Regular rhythm.   Pulmonary/Chest: Effort normal.   Abdominal: Soft.   Neurological: He is alert and oriented to person, place, and time.   Nursing note and vitals reviewed.       Radiology:  Ct Abdomen Pelvis Without Contrast    Result Date: 9/6/2020   1. Ventral abdominal wall hernia just above the umbilicus with a loop of small bowel prolapsed through the hernia defect and evidence of small bowel obstruction secondary to the hernia. 2. Cholelithiasis 3. Sigmoid diverticulosis without diverticulitis  Electronically Signed By-Florencio Sarabia On:9/6/2020 9:07 AM This report was finalized on [id number] by  Florencio Sarabia, .         Results Review:     I reviewed the patient's new clinical results.  I reviewed the patient's new imaging results and agree with the interpretation.    Medication Review:   Scheduled Meds:  amLODIPine 5 mg Oral Q24H   calcium acetate 1,334 mg Oral BID   docusate sodium 100 mg Oral BID   enoxaparin 40 mg Subcutaneous Q24H   losartan 100 mg Oral Daily   metoprolol tartrate 12.5 mg Oral Q12H   pantoprazole 40 mg Oral QAM   sodium chloride 10 mL Intravenous Q12H   tamsulosin 0.4 mg Oral Daily   thiamine 100 mg Oral Daily     Continuous Infusions:  lactated ringers 125  mL/hr Last Rate: 125 mL/hr (09/07/20 1543)     PRN Meds:.•  acetaminophen **OR** acetaminophen **OR** acetaminophen  •  bisacodyl  •  HYDROcodone-acetaminophen  •  magnesium hydroxide  •  magnesium sulfate **OR** magnesium sulfate **OR** magnesium sulfate  •  melatonin  •  Morphine **AND** naloxone  •  nitroglycerin  •  ondansetron **OR** ondansetron  •  potassium chloride **OR** potassium chloride **OR** potassium chloride  •  [COMPLETED] Insert peripheral IV **AND** sodium chloride  •  sodium chloride    Labs:    CBC    Results from last 7 days   Lab Units 09/08/20  0234 09/07/20  0323 09/06/20  1008   WBC 10*3/mm3 8.10 8.40 12.20*   HEMOGLOBIN g/dL 13.1 13.1 15.8   PLATELETS 10*3/mm3 246 270 320     BMP Results from last 7 days   Lab Units 09/08/20 0234 09/07/20 0323 09/06/20  1008   SODIUM mmol/L 137 140 141   POTASSIUM mmol/L 3.9 4.3 4.2   CHLORIDE mmol/L 101 106 105   CO2 mmol/L 28.0 26.0 25.0   BUN  17 26* 26*   CREATININE mg/dL 0.94 1.15 1.16   GLUCOSE mg/dL 114* 133* 156*   MAGNESIUM mg/dL  --  2.2  --      Cr Clearance Estimated Creatinine Clearance: 99.4 mL/min (by C-G formula based on SCr of 0.94 mg/dL).  Coag   Results from last 7 days   Lab Units 09/06/20  1008   INR  0.94     HbA1C   Lab Results   Component Value Date    HGBA1C 5.3 09/06/2020     Blood Glucose No results found for: POCGLU  Infection     CMP Results from last 7 days   Lab Units 09/08/20  0234 09/07/20  0323 09/06/20  1008   SODIUM mmol/L 137 140 141   POTASSIUM mmol/L 3.9 4.3 4.2   CHLORIDE mmol/L 101 106 105   CO2 mmol/L 28.0 26.0 25.0   BUN  17 26* 26*   CREATININE mg/dL 0.94 1.15 1.16   GLUCOSE mg/dL 114* 133* 156*   ALBUMIN g/dL 3.30*  --   --    BILIRUBIN mg/dL 0.7  --   --    ALK PHOS U/L 75  --   --    AST (SGOT) U/L 22  --   --    ALT (SGPT) U/L 11  --   --      UA      Radiology(recent) Ct Abdomen Pelvis Without Contrast    Result Date: 9/6/2020   1. Ventral abdominal wall hernia just above the umbilicus with a loop of small  bowel prolapsed through the hernia defect and evidence of small bowel obstruction secondary to the hernia. 2. Cholelithiasis 3. Sigmoid diverticulosis without diverticulitis  Electronically Signed By-Florencio Sarabia On:9/6/2020 9:07 AM This report was finalized on [id number] by  Florencio Sarabia, .     Assessment:  Ventral hernia with obstruction and without gangrene  Status post incisional hernia repair September 6, 2020  SVT (supraventricular tachycardia) (CMS/HCC)  Primary essential hypertension  Ankylosing spondylitis  OANH  Degenerative disc disease cervical spine  Dyslipidemia  BPH with LUTS  Gastroesophageal reflux disease without esophagitis  Diverticulosis    Plan:    Postoperative pathway        Mani Aceves MD  09/08/20  07:33

## 2020-09-08 NOTE — PROGRESS NOTES
Discharge Planning Assessment  [facility]     Patient Name: Florencio Rangel  MRN: [id number]  Today's Date: 9/8/2020    Admit Date: 9/6/2020    Discharge Needs Assessment     Row Name 09/08/20 1009       Living Environment    Lives With  spouse    Current Living Arrangements  home/apartment/condo    Primary Care Provided by  self;spouse/significant other    Provides Primary Care For  no one    Family Caregiver if Needed  spouse    Quality of Family Relationships  supportive    Able to Return to Prior Arrangements  yes       Resource/Environmental Concerns    Resource/Environmental Concerns  none    Transportation Concerns  car, none       Transition Planning    Patient/Family Anticipates Transition to  home with family    Patient/Family Anticipated Services at Transition  none    Transportation Anticipated  family or friend will provide       Discharge Needs Assessment    Readmission Within the Last 30 Days  no previous admission in last 30 days    Concerns to be Addressed  no discharge needs identified;denies needs/concerns at this time    Equipment Currently Used at Home  walker, rolling Patient has a walker at home but does not use it.     Anticipated Changes Related to Illness  none    Equipment Needed After Discharge  none    Provided Post Acute Provider List?  N/A    Discharge Coordination/Progress  Routine discharge home with spouse. Patient denies any need for home health at this time.         Discharge Plan     Row Name 09/08/20 1011       Plan    Plan  Routine discharge home with spouse. Patient denies any need for home health at this time.     Patient/Family in Agreement with Plan  yes    Plan Comments  Spoke with patient from the doorway, greater than 8 feet with mask and goggles in place for less than 10 minutes. Patient is independent and lives with spouse. Verified with patient his PCP, pharmacy and any DME.  Patient denied any need for home health. CM asked patient multiple times to be sure and patient  answered multiple times that he did not need home health. Barriers to discharge: diet to be advanced, patient currently  on clears. Patient has not had a bowel movement yet.              Expected Discharge Date and Time     Expected Discharge Date Expected Discharge Time    Sep 9, 2020         Demographic Summary     Row Name 09/08/20 1008       General Information    Admission Type  inpatient    Arrived From  emergency department    Required Notices Provided  Important Message from Medicare    Referral Source  admission list    Reason for Consult  discharge planning    Preferred Language  English     Used During This Interaction  no       Contact Information    Permission Granted to Share Info With                Anna [place] [address]   [phone]  office  [phone]  fax  [email or web address]

## 2020-09-08 NOTE — PLAN OF CARE
Patient has been sitting up in chair and ambulating in hallway without assist. Denies pain at this time. Per Dr. Montanez diet would be advanced today and possibly home tomorrow.

## 2020-09-08 NOTE — PROGRESS NOTES
Cardiology Progress Note      Admiting Physician:  Mani Aceves MD   LOS: 2 days       Reason For Followup:  SVT      Subjective:    Interval History:  Seen and examined.  Chart and labs reviewed.  Patient denies any chest pain pressure heaviness or tightness.  Denies any shortness of breath.  Reports passing gas but no bowel movement.  No further arrhythmias noted.    Review of Systems:  A complete review of systems was undertaken with the pertinent cardiovascular findings listed in history of present illness and all other systems being negative.     Assessment & Plan    Impressions:  SVT likely AV elfego reentry tachycardia  Ventral hernia status post surgical repair  Hypertension  Normal left ventricular systolic function EF 60 to 65%    Recommendations:  Continue with low-dose beta-blocker therapy  Cardiac status appears stable.  We will see as needed.  Please call if needed  Follow-up 4 to 6 weeks post discharge.        Objective:    Medication Review:   Scheduled Meds:  amLODIPine 5 mg Oral Q24H   calcium acetate 1,334 mg Oral BID   docusate sodium 100 mg Oral BID   enoxaparin 40 mg Subcutaneous Q24H   losartan 100 mg Oral Daily   metoprolol tartrate 12.5 mg Oral Q12H   pantoprazole 40 mg Oral QAM   polyethylene glycol 17 g Oral BID   sodium chloride 10 mL Intravenous Q12H   tamsulosin 0.4 mg Oral Daily   thiamine 100 mg Oral Daily     Continuous Infusions:  lactated ringers 50 mL/hr Last Rate: 50 mL/hr (09/08/20 1439)     PRN Meds:.•  acetaminophen **OR** acetaminophen **OR** acetaminophen  •  bisacodyl  •  HYDROcodone-acetaminophen  •  magnesium hydroxide  •  magnesium sulfate **OR** magnesium sulfate **OR** magnesium sulfate  •  melatonin  •  Morphine **AND** naloxone  •  nitroglycerin  •  ondansetron **OR** ondansetron  •  potassium chloride **OR** potassium chloride **OR** potassium chloride  •  [COMPLETED] Insert peripheral IV **AND** sodium chloride  •  sodium chloride    Patient Active Problem List    Diagnosis   • Ventral hernia with obstruction and without gangrene   • SVT (supraventricular tachycardia) (CMS/HCC)   • Essential hypertension   • Ankylosing spondylitis (CMS/HCC)   • Anxiety disorder   • Depression   • Other cervical disc degeneration, unspecified cervical region   • Spinal stenosis of cervical region   • Hypertension         Physical Exam:    General: Alert, cooperative, no distress, appears stated age  Head:  Normocephalic, atraumatic, mucous membranes moist  Eyes:  Conjunctiva/corneas clear, EOM's intact     Neck:  Supple,  no bruit  Lungs: Clear to auscultation bilaterally, no wheezes rhonchi rales are noted  Chest wall: No tenderness  Heart::  Regular rate and rhythm, S1 and S2 normal, no murmur, rub or gallop  Abdomen: Soft, mild tenderness.  Nondistended bowel sounds active  Extremities: No cyanosis, clubbing, or edema  Pulses: 2+ and symmetric all extremities  Skin:  No rashes or lesions  Neuro/psych: A&O x3. CN II through XII are grossly intact with appropriate affect    Vital Signs:  Vitals:    09/07/20 2049 09/08/20 0040 09/08/20 0455 09/08/20 1300   BP: 157/76 148/77 149/77 149/72   BP Location: Left arm Left arm Left arm Left arm   Patient Position: Lying Lying Lying Lying   Pulse: 87 78 70    Resp: 17 18 17 16   Temp: 99.5 °F (37.5 °C) 99.4 °F (37.4 °C) 98.3 °F (36.8 °C) 97.9 °F (36.6 °C)   TempSrc: Oral Oral Oral Oral   SpO2: 92% 95% 96% 95%   Weight:   117 kg (257 lb 8 oz)    Height:         Wt Readings from Last 1 Encounters:   09/08/20 117 kg (257 lb 8 oz)       Intake/Output Summary (Last 24 hours) at 9/8/2020 1521  Last data filed at 9/8/2020 1334  Gross per 24 hour   Intake 1485 ml   Output 3125 ml   Net -1640 ml         Results Review:     CBC    Results from last 7 days   Lab Units 09/08/20  0234 09/07/20  0323 09/06/20  1008   WBC 10*3/mm3 8.10 8.40 12.20*   HEMOGLOBIN g/dL 13.1 13.1 15.8   PLATELETS 10*3/mm3 246 270 320     Cr Clearance Estimated Creatinine Clearance:  99.4 mL/min (by C-G formula based on SCr of 0.94 mg/dL).  Coag   Results from last 7 days   Lab Units 09/06/20  1008   INR  0.94     HbA1C   Lab Results   Component Value Date    HGBA1C 5.3 09/06/2020     Blood Glucose No results found for: POCGLU  Infection     CMP Results from last 7 days   Lab Units 09/08/20  0234 09/07/20  0323 09/06/20  1008   SODIUM mmol/L 137 140 141   POTASSIUM mmol/L 3.9 4.3 4.2   CHLORIDE mmol/L 101 106 105   CO2 mmol/L 28.0 26.0 25.0   BUN  17 26* 26*   CREATININE mg/dL 0.94 1.15 1.16   GLUCOSE mg/dL 114* 133* 156*   ALBUMIN g/dL 3.30*  --   --    BILIRUBIN mg/dL 0.7  --   --    ALK PHOS U/L 75  --   --    AST (SGOT) U/L 22  --   --    ALT (SGPT) U/L 11  --   --      ABG      UA      ELENITA  No results found for: POCMETH, POCAMPHET, POCBARBITUR, POCBENZO, POCCOCAINE, POCOPIATES, POCOXYCODO, POCPHENCYC, POCPROPOXY, POCTHC, POCTRICYC  Lysis Labs Results from last 7 days   Lab Units 09/08/20  0234 09/07/20  0323 09/06/20  1008   INR   --   --  0.94   HEMOGLOBIN g/dL 13.1 13.1 15.8   PLATELETS 10*3/mm3 246 270 320   CREATININE mg/dL 0.94 1.15 1.16     Radiology(recent) No radiology results for the last day            Imaging Results (Last 24 Hours)     ** No results found for the last 24 hours. **          Cardiac Studies:  Echo-   Results for orders placed during the hospital encounter of 09/06/20   Adult Transthoracic Echo Complete W/ Cont if Necessary Per Protocol    Narrative · Left ventricular systolic function is normal.  · Estimated EF appears to be in the range of 61 - 65%.  · Mild dilation of the aortic root is present. Mild dilation of the   proximal aorta is present.  · Left ventricular wall thickness is consistent with moderate concentric   hypertrophy.  · Right ventricular cavity is mildly dilated.  · Left ventricular diastolic dysfunction (grade I) consistent with   impaired relaxation.        Stress Myoview-  Cath-        Carl Lopez   09/08/20  15:21

## 2020-09-08 NOTE — PLAN OF CARE
Problem: Patient Care Overview  Goal: Plan of Care Review  Outcome: Ongoing (interventions implemented as appropriate)  Flowsheets (Taken 9/8/2020 8601)  Progress: improving  Plan of Care Reviewed With: patient  Note:   Pt rested comfortably through the night with no complaints. Will continue to monitor.

## 2020-09-09 VITALS
BODY MASS INDEX: 31.57 KG/M2 | OXYGEN SATURATION: 95 % | HEART RATE: 65 BPM | RESPIRATION RATE: 18 BRPM | SYSTOLIC BLOOD PRESSURE: 145 MMHG | HEIGHT: 76 IN | TEMPERATURE: 98.3 F | WEIGHT: 259.26 LBS | DIASTOLIC BLOOD PRESSURE: 72 MMHG

## 2020-09-09 LAB
ANION GAP SERPL CALCULATED.3IONS-SCNC: 8 MMOL/L (ref 5–15)
BASOPHILS # BLD AUTO: 0 10*3/MM3 (ref 0–0.2)
BASOPHILS NFR BLD AUTO: 0.3 % (ref 0–1.5)
BUN SERPL-MCNC: 15 MG/DL (ref 8–23)
BUN SERPL-MCNC: ABNORMAL MG/DL
BUN/CREAT SERPL: ABNORMAL
CALCIUM SPEC-SCNC: 8.3 MG/DL (ref 8.6–10.5)
CHLORIDE SERPL-SCNC: 100 MMOL/L (ref 98–107)
CO2 SERPL-SCNC: 30 MMOL/L (ref 22–29)
CREAT SERPL-MCNC: 0.97 MG/DL (ref 0.76–1.27)
DEPRECATED RDW RBC AUTO: 41.6 FL (ref 37–54)
EOSINOPHIL # BLD AUTO: 0.1 10*3/MM3 (ref 0–0.4)
EOSINOPHIL NFR BLD AUTO: 1.3 % (ref 0.3–6.2)
ERYTHROCYTE [DISTWIDTH] IN BLOOD BY AUTOMATED COUNT: 12.8 % (ref 12.3–15.4)
GFR SERPL CREATININE-BSD FRML MDRD: 76 ML/MIN/1.73
GLUCOSE SERPL-MCNC: 101 MG/DL (ref 65–99)
HCT VFR BLD AUTO: 35.7 % (ref 37.5–51)
HGB BLD-MCNC: 12.5 G/DL (ref 13–17.7)
LYMPHOCYTES # BLD AUTO: 1 10*3/MM3 (ref 0.7–3.1)
LYMPHOCYTES NFR BLD AUTO: 14.2 % (ref 19.6–45.3)
MCH RBC QN AUTO: 32.2 PG (ref 26.6–33)
MCHC RBC AUTO-ENTMCNC: 34.9 G/DL (ref 31.5–35.7)
MCV RBC AUTO: 92.1 FL (ref 79–97)
MONOCYTES # BLD AUTO: 0.7 10*3/MM3 (ref 0.1–0.9)
MONOCYTES NFR BLD AUTO: 10.3 % (ref 5–12)
NEUTROPHILS NFR BLD AUTO: 5.2 10*3/MM3 (ref 1.7–7)
NEUTROPHILS NFR BLD AUTO: 73.9 % (ref 42.7–76)
NRBC BLD AUTO-RTO: 0 /100 WBC (ref 0–0.2)
PLATELET # BLD AUTO: 236 10*3/MM3 (ref 140–450)
PMV BLD AUTO: 7.2 FL (ref 6–12)
POTASSIUM SERPL-SCNC: 4 MMOL/L (ref 3.5–5.2)
RBC # BLD AUTO: 3.87 10*6/MM3 (ref 4.14–5.8)
SODIUM SERPL-SCNC: 138 MMOL/L (ref 136–145)
WBC # BLD AUTO: 7.1 10*3/MM3 (ref 3.4–10.8)

## 2020-09-09 PROCEDURE — 85025 COMPLETE CBC W/AUTO DIFF WBC: CPT | Performed by: FAMILY MEDICINE

## 2020-09-09 PROCEDURE — 80048 BASIC METABOLIC PNL TOTAL CA: CPT | Performed by: FAMILY MEDICINE

## 2020-09-09 PROCEDURE — 99024 POSTOP FOLLOW-UP VISIT: CPT | Performed by: SURGERY

## 2020-09-09 RX ORDER — POLYETHYLENE GLYCOL 3350 17 G/17G
17 POWDER, FOR SOLUTION ORAL 2 TIMES DAILY PRN
Start: 2020-09-09 | End: 2022-11-09

## 2020-09-09 RX ADMIN — LOSARTAN POTASSIUM 100 MG: 50 TABLET, FILM COATED ORAL at 10:06

## 2020-09-09 RX ADMIN — METOPROLOL TARTRATE 12.5 MG: 25 TABLET, FILM COATED ORAL at 10:06

## 2020-09-09 RX ADMIN — DOCUSATE SODIUM 100 MG: 100 CAPSULE, LIQUID FILLED ORAL at 10:35

## 2020-09-09 RX ADMIN — TAMSULOSIN HYDROCHLORIDE 0.4 MG: 0.4 CAPSULE ORAL at 10:06

## 2020-09-09 RX ADMIN — AMLODIPINE BESYLATE 5 MG: 5 TABLET ORAL at 10:05

## 2020-09-09 RX ADMIN — POLYETHYLENE GLYCOL 3350 17 G: 17 POWDER, FOR SOLUTION ORAL at 10:07

## 2020-09-09 RX ADMIN — CALCIUM ACETATE 1334 MG: 667 CAPSULE ORAL at 10:06

## 2020-09-09 RX ADMIN — Medication 100 MG: at 10:07

## 2020-09-09 RX ADMIN — PANTOPRAZOLE SODIUM 40 MG: 40 TABLET, DELAYED RELEASE ORAL at 05:46

## 2020-09-09 NOTE — PROGRESS NOTES
General Surgery Progress Note     LOS: 3 days   Patient Care Team:  Mani Aceves MD as PCP - General (Family Medicine)    Subjective     Interval History:   Postop day 3 ventral hernia repair for incarcerated incisional hernia with signs of obstruction    Tolerating low residue diet having plenty of flatus.  No nausea or vomiting.  Still waiting on bowel movement.  No abdominal pain not taking any pain medication.  Independently ambulatory.  Objective     Vital Signs  Temp:  [98.3 °F (36.8 °C)-98.7 °F (37.1 °C)] 98.3 °F (36.8 °C)  Heart Rate:  [65-81] 65  Resp:  [16-18] 18  BP: (145-158)/(72-79) 145/72    Physical Exam   Constitutional: He appears well-developed and well-nourished.   HENT:   Head: Normocephalic and atraumatic.   Pulmonary/Chest: Effort normal.   Abdominal:   Soft and obese appropriately tender.  Minimal drainage from the inferior aspect of the incision no evidence of erythema   Musculoskeletal: He exhibits no edema or tenderness.   Neurological: He is alert. No cranial nerve deficit.   Skin: Skin is warm and dry.   Psychiatric: He has a normal mood and affect. His behavior is normal.          Results Review:    Lab Results (last 24 hours)     Procedure Component Value Units Date/Time    BUN [[id number]]  (Normal) Collected:  09/09/20 0349    Specimen:  Blood Updated:  09/09/20 0804     BUN 15 mg/dL     Basic Metabolic Panel [[id number]]  (Abnormal) Collected:  09/09/20 0349    Specimen:  Blood Updated:  09/09/20 0441     Glucose 101 mg/dL      BUN --     Comment: Testing performed by alternate method        Creatinine 0.97 mg/dL      Sodium 138 mmol/L      Potassium 4.0 mmol/L      Chloride 100 mmol/L      CO2 30.0 mmol/L      Calcium 8.3 mg/dL      eGFR Non African Amer 76 mL/min/1.73      BUN/Creatinine Ratio --     Comment: Testing not performed        Anion Gap 8.0 mmol/L     Narrative:       GFR Normal >60  Chronic Kidney Disease <60  Kidney Failure <15      CBC & Differential [[id number]]  Collected:  09/09/20 0349    Specimen:  Blood Updated:  09/09/20 0415    Narrative:       The following orders were created for panel order CBC & Differential.  Procedure                               Abnormality         Status                     ---------                               -----------         ------                     CBC Auto Differential[[id number]]        Abnormal            Final result                 Please view results for these tests on the individual orders.    CBC Auto Differential [[id number]]  (Abnormal) Collected:  09/09/20 0349    Specimen:  Blood Updated:  09/09/20 0415     WBC 7.10 10*3/mm3      RBC 3.87 10*6/mm3      Hemoglobin 12.5 g/dL      Hematocrit 35.7 %      MCV 92.1 fL      MCH 32.2 pg      MCHC 34.9 g/dL      RDW 12.8 %      RDW-SD 41.6 fl      MPV 7.2 fL      Platelets 236 10*3/mm3      Neutrophil % 73.9 %      Lymphocyte % 14.2 %      Monocyte % 10.3 %      Eosinophil % 1.3 %      Basophil % 0.3 %      Neutrophils, Absolute 5.20 10*3/mm3      Lymphocytes, Absolute 1.00 10*3/mm3      Monocytes, Absolute 0.70 10*3/mm3      Eosinophils, Absolute 0.10 10*3/mm3      Basophils, Absolute 0.00 10*3/mm3      nRBC 0.0 /100 WBC         Imaging Results (Last 24 Hours)     ** No results found for the last 24 hours. **         I reviewed the patient's new clinical results.    Medication Review:    Current Facility-Administered Medications:   •  acetaminophen (TYLENOL) tablet 650 mg, 650 mg, Oral, Q4H PRN **OR** acetaminophen (TYLENOL) 160 MG/5ML solution 650 mg, 650 mg, Oral, Q4H PRN **OR** acetaminophen (TYLENOL) suppository 650 mg, 650 mg, Rectal, Q4H PRN, Lynn Alfredon A, DO  •  amLODIPine (NORVASC) tablet 5 mg, 5 mg, Oral, Q24H, Yovana Alfredo A, DO, 5 mg at 09/09/20 1005  •  bisacodyl (DULCOLAX) suppository 10 mg, 10 mg, Rectal, Daily PRN, Yovana Alfredo A, DO  •  calcium acetate (PHOS BINDER)) capsule 1,334 mg, 1,334 mg, Oral, BID, Alfredo, Yovana A, DO, 1,334 mg at 09/09/20 1006  •   docusate sodium (COLACE) capsule 100 mg, 100 mg, Oral, BID, Lela Melo, APRN, 100 mg at 09/09/20 1035  •  enoxaparin (LOVENOX) syringe 40 mg, 40 mg, Subcutaneous, Q24H, Yo Montanez MD, 40 mg at 09/08/20 1527  •  HYDROcodone-acetaminophen (NORCO) 5-325 MG per tablet 1 tablet, 1 tablet, Oral, Q4H PRN, Yovana Alfredo DO, 1 tablet at 09/07/20 1453  •  lactated ringers infusion, 50 mL/hr, Intravenous, Continuous, Yo Montanez MD, Last Rate: 50 mL/hr at 09/08/20 2031, 50 mL/hr at 09/08/20 2031  •  losartan (COZAAR) tablet 100 mg, 100 mg, Oral, Daily, Yovana Alfredo, , 100 mg at 09/09/20 1006  •  magnesium hydroxide (MILK OF MAGNESIA) suspension 2400 mg/10mL 10 mL, 10 mL, Oral, Daily PRN, Yovana Alfredo A, DO  •  Magnesium Sulfate 2 gram Bolus, followed by 8 gram infusion (total Mg dose 10 grams)- Mg less than or equal to 1mg/dL, 2 g, Intravenous, PRN **OR** Magnesium Sulfate 2 gram / 50mL Infusion (GIVE X 3 BAGS TO EQUAL 6GM TOTAL DOSE) - Mg 1.1 - 1.5 mg/dl, 2 g, Intravenous, PRN **OR** Magnesium Sulfate 4 gram infusion- Mg 1.6-1.9 mg/dL, 4 g, Intravenous, PRN, Yovana Alfredo A, DO  •  melatonin tablet 5 mg, 5 mg, Oral, Nightly PRN, Yovana Alfredo A, DO  •  metoprolol tartrate (LOPRESSOR) tablet 12.5 mg, 12.5 mg, Oral, Q12H, Yovana Alfredo, , 12.5 mg at 09/09/20 1006  •  Morphine sulfate (PF) injection 4 mg, 4 mg, Intravenous, Q2H PRN **AND** naloxone (NARCAN) injection 0.4 mg, 0.4 mg, Intravenous, Q5 Min PRN, Yovana Alfredo DO  •  nitroglycerin (NITROSTAT) SL tablet 0.4 mg, 0.4 mg, Sublingual, Q5 Min PRN, Yovana Alfredo DO  •  ondansetron (ZOFRAN) tablet 4 mg, 4 mg, Oral, Q6H PRN **OR** ondansetron (ZOFRAN) injection 4 mg, 4 mg, Intravenous, Q6H PRN, Yovana Alfredo DO, 4 mg at 09/06/20 2240  •  pantoprazole (PROTONIX) EC tablet 40 mg, 40 mg, Oral, QAM, Yovana Alfredo DO, 40 mg at 09/09/20 0546  •  polyethylene glycol (MIRALAX) packet 17 g, 17 g, Oral, BID, Yo Montanez MD, 17 g at 09/09/20 1007  •   potassium chloride (K-DUR,KLOR-CON) CR tablet 40 mEq, 40 mEq, Oral, PRN **OR** potassium chloride (KLOR-CON) packet 40 mEq, 40 mEq, Oral, PRN **OR** potassium chloride 10 mEq in 100 mL IVPB, 10 mEq, Intravenous, Q1H PRN, Alfredo, Yovana A, DO  •  [COMPLETED] Insert peripheral IV, , , Once **AND** sodium chloride 0.9 % flush 10 mL, 10 mL, Intravenous, PRN, Alfredo, Yovana A, DO  •  sodium chloride 0.9 % flush 10 mL, 10 mL, Intravenous, Q12H, Alfredo, Yovana A, DO, 10 mL at 09/08/20 0809  •  sodium chloride 0.9 % flush 10 mL, 10 mL, Intravenous, PRN, Alfredo, Yovana A, DO  •  tamsulosin (FLOMAX) 24 hr capsule 0.4 mg, 0.4 mg, Oral, Daily, Alfredo, Yovana A, DO, 0.4 mg at 09/09/20 1006  •  Vitamin B1 tablet 100 mg, 100 mg, Oral, Daily, Alfredo, Yovana A, DO, 100 mg at 09/09/20 1007    Assessment/Plan     Active Problems:    Ventral hernia with obstruction and without gangrene    SVT (supraventricular tachycardia) (CMS/HCC)    Essential hypertension      Postop day 3 repair of incisional hernia with incarceration and bowel obstruction    Although he has not had a bowel movement he is pretty eager to go home.  I counseled him that he needs to take MiraLAX twice daily until bowel movement.  He is doing well on his diet abdominal exam looks great he is passing plenty of flatus and ambulatory.  His discharge follow-up has been placed in the chart.  I am okay with him going home today.    This note was created using Dragon Voice Recognition software.    Yo Montanez MD  09/09/20  14:03

## 2020-09-09 NOTE — DISCHARGE SUMMARY
Date of Discharge:  9/9/2020    Discharge Diagnosis:     Ventral hernia with obstruction and without gangrene  Status post incisional hernia repair September 6, 2020  SVT (supraventricular tachycardia) (CMS/HCC)  Primary essential hypertension  Ankylosing spondylitis  OANH  Degenerative disc disease cervical spine  Dyslipidemia  BPH with LUTS  Gastroesophageal reflux disease without esophagitis  Diverticulosis    Presenting Problem/History of Present Illness  Active Hospital Problems    Diagnosis  POA   • **Ventral hernia with obstruction and without gangrene [K43.6]  Yes   • SVT (supraventricular tachycardia) (CMS/HCC) [I47.1]  Unknown   • Essential hypertension [I10]  Yes      Resolved Hospital Problems   No resolved problems to display.          Hospital Course  Patient is a 70 y.o. male presented with progressive abdominal pain and was found to have an incarcerated incisional hernia.  He was taken to the OR for operative repair and did well in the post-operative period.  He was found to be stable and sent home today for F/U with us within 2 weeks and with general surgery in 3-4 weeks.  He will call with any questions or concerns..      Procedures Performed    Procedure(s):  INCISIONAL HERNIA REPAIR  -------------------       Consults:   Consults     Date and Time Order Name Status Description    9/6/2020 2016 Inpatient General Surgery Consult      9/6/2020 1141 Cardiology (on-call MD unless specified) Completed     9/6/2020 0843 Surgery (on-call MD unless specified) Completed           Pertinent Test Results:Ct Abdomen Pelvis Without Contrast    Result Date: 9/6/2020   1. Ventral abdominal wall hernia just above the umbilicus with a loop of small bowel prolapsed through the hernia defect and evidence of small bowel obstruction secondary to the hernia. 2. Cholelithiasis 3. Sigmoid diverticulosis without diverticulitis  Electronically Signed By-Florencio Sarabia On:9/6/2020 9:07 AM This report was finalized on  [id number] by  Florencio Sarabia .      Imaging Results (Last 7 Days)     Procedure Component Value Units Date/Time    CT Abdomen Pelvis Without Contrast [[id number]] Collected:  09/06/20 0903     Updated:  09/06/20 1137    Narrative:          DATE OF EXAM:  9/6/2020 8:54 AM     PROCEDURE:  CT ABDOMEN PELVIS WO CONTRAST-     INDICATIONS:  hernia     COMPARISON:  No Comparisons Available     TECHNIQUE:  Routine transaxial slices were obtained through the abdomen and pelvis  without the administration of intravenous contrast. Reconstructed  coronal and sagittal images were also obtained. Automated exposure  control and iterative construction methods were used.     FINDINGS:  There are old healed right-sided rib fractures with some pleural  thickening at the site and some linear scarring in the right base  multiple gallstones are present in the gallbladder. The liver has a  normal appearance. The spleen, adrenal glands, and pancreas are within  normal limits. There is a dense cortical calcification identified in the  upper pole of the left kidney, likely a stone with overlying renal  cortical thinning. This calcification measures 12 mm in diameter. There  is an adjacent renal cyst. There is no evidence of obstructive uropathy.  There is a ventral abdominal wall hernia just above the umbilicus. The  defect in the anterior abdominal wall measures 5 cm in diameter. There  is a small bowel loop prolapsed through the defect with evidence of  small bowel obstruction. Distal to this loop of bowel, the small bowel  is decompressed as is the colon. There is sigmoid diverticulosis. The  bladder appears normal. The appendix is absent.       Impression:          1. Ventral abdominal wall hernia just above the umbilicus with a loop of  small bowel prolapsed through the hernia defect and evidence of small  bowel obstruction secondary to the hernia.  2. Cholelithiasis  3. Sigmoid diverticulosis without diverticulitis     Electronically  Signed By-Florencio Sarabia On:9/6/2020 9:07 AM  This report was finalized on [id number] by  Florencio Sarabia, .              Condition on Discharge:  Fair    Vital Signs  Temp:  [98.3 °F (36.8 °C)-98.7 °F (37.1 °C)] 98.3 °F (36.8 °C)  Heart Rate:  [65-81] 65  Resp:  [16-18] 18  BP: (145-158)/(72-79) 145/72    Physical Exam:     General Appearance:    Alert, cooperative, in no acute distress   Head:    Normocephalic, without obvious abnormality, atraumatic   Eyes:           Conjunctivae and sclerae normal, no   icterus, no pallor, corneas clear, PERRLA   Throat:   No oral lesions, no thrush, oral mucosa moist   Neck:   No adenopathy, supple, trachea midline, no thyromegaly, no   carotid bruit, no JVD   Lungs:     Clear to auscultation,respirations regular, even and                  unlabored    Heart:    Regular rhythm and normal rate, normal S1 and S2, no            murmur, no gallop, no rub, no click   Chest Wall:    No abnormalities observed   Abdomen:     Normal bowel sounds, no masses, no organomegaly, soft        non-tender, non-distended, no guarding, no rebound                tenderness   Rectal:     Deferred   Extremities:   Moves all extremities well, no edema, no cyanosis, no             redness   Pulses:   Pulses palpable and equal bilaterally   Skin:   No bleeding, bruising or rash   Lymph nodes:   No palpable adenopathy   Neurologic:   Cranial nerves 2 - 12 grossly intact, sensation intact, DTR       present and equal bilaterally         Discharge Disposition      Discharge Medications     Discharge Medications      New Medications      Instructions Start Date   polyethylene glycol 17 g packet  Commonly known as:  MIRALAX   17 g, Oral, 2 Times Daily PRN         ASK your doctor about these medications      Instructions Start Date   amLODIPine 5 MG tablet  Commonly known as:  NORVASC   Take 1 tablet by mouth twice daily      aspirin 81 MG chewable tablet   81 mg, Oral, Daily      calcium acetate 667 MG capsule  capsule  Commonly known as:  PHOS BINDER)   1,334 mg, Oral, 2 times daily      cholecalciferol 10 MCG (400 UNIT) tablet  Commonly known as:  VITAMIN D3   800 Units, Oral, Daily      fish oil 1000 MG capsule capsule   1,000 mg, Oral, Daily With Breakfast      losartan 100 MG tablet  Commonly known as:  COZAAR   100 mg, Oral, Daily      omeprazole 20 MG capsule  Commonly known as:  priLOSEC   20 mg, Oral, Daily      sulindac 150 MG tablet  Commonly known as:  CLINORIL   150 mg, Oral, 2 Times Daily      tamsulosin 0.4 MG capsule 24 hr capsule  Commonly known as:  FLOMAX   1 capsule, Oral, Daily      thiamine 100 MG tablet  Commonly known as:  VITAMIN B-1   100 mg, Oral, Daily             Discharge Diet:   Diet Instructions     Diet: Regular      Discharge Diet:  Regular          Activity at Discharge:   Activity Instructions     Driving Restrictions      Type of Restriction:  Driving    Driving Restrictions:  No Driving Until Next Appointment    Lifting Restrictions      Type of Restriction:  Lifting    Lifting Restrictions:  Other    Explain Lifing Restrictions:  No lifting more than 15 lbs for 6 weeks. Or otherwise specified the day of surgery.  Wear abdominal binder at all times    Other Activity Instructions      Activity Instructions: May shower in 24 hours. Do not tub soak incision or swim for at least 2 weeks.          Follow-up Appointments  No future appointments.  Additional Instructions for the Follow-ups that You Need to Schedule     Call MD With Problems / Concerns   As directed      Call the office, [phone] with any questions or concerns following your surgery.    Order Comments:  Call the office, [phone] with any questions or concerns following your surgery.          Discharge Follow-up with Specified Provider: Dr. Montanez, General Surgery. Call [phone] to schedule; 2 Weeks   As directed      To:  Dr. Montanez, General Surgery. Call [phone] to schedule    Follow Up:  2 Weeks               Test Results  Pending at Discharge       Mani Aceves MD  09/09/20  14:23

## 2020-09-09 NOTE — PLAN OF CARE
Pt doing well. No complaints of pain throughout the night. Pt has not had a BM yet. VSS. Resting well. Will continue to monitor.

## 2020-09-09 NOTE — PROGRESS NOTES
LOS: 3 days   Patient Care Team:  Mani Aceves MD as PCP - General (Family Medicine)    Subjective:  Feels better overall  Objective:   + Flatus//no bowel movement yet//afebrile    Review of Systems:   Review of Systems   Respiratory: Negative.    Cardiovascular: Negative.    Musculoskeletal: Negative.    Neurological: Positive for weakness.           Vital Signs  Temp:  [97.9 °F (36.6 °C)-98.7 °F (37.1 °C)] 98.7 °F (37.1 °C)  Heart Rate:  [72-81] 72  Resp:  [16-18] 18  BP: (149-158)/(72-79) 158/75    Physical Exam:  Physical Exam   Constitutional: He is oriented to person, place, and time. He appears well-developed and well-nourished.   Eyes: Pupils are equal, round, and reactive to light. EOM are normal.   Cardiovascular: Normal heart sounds.   Pulmonary/Chest: Effort normal.   Abdominal: Soft. There is tenderness.   Neurological: He is alert and oriented to person, place, and time.   Skin: Skin is warm.   Nursing note and vitals reviewed.       Radiology:  Ct Abdomen Pelvis Without Contrast    Result Date: 9/6/2020   1. Ventral abdominal wall hernia just above the umbilicus with a loop of small bowel prolapsed through the hernia defect and evidence of small bowel obstruction secondary to the hernia. 2. Cholelithiasis 3. Sigmoid diverticulosis without diverticulitis  Electronically Signed By-Florencio Sarabia On:9/6/2020 9:07 AM This report was finalized on [id number] by  Florencio Sarabia, .         Results Review:     I reviewed the patient's new clinical results.  I reviewed the patient's new imaging results and agree with the interpretation.    Medication Review:   Scheduled Meds:  amLODIPine 5 mg Oral Q24H   calcium acetate 1,334 mg Oral BID   docusate sodium 100 mg Oral BID   enoxaparin 40 mg Subcutaneous Q24H   losartan 100 mg Oral Daily   metoprolol tartrate 12.5 mg Oral Q12H   pantoprazole 40 mg Oral QAM   polyethylene glycol 17 g Oral BID   sodium chloride 10 mL Intravenous Q12H   tamsulosin 0.4 mg Oral  Daily   thiamine 100 mg Oral Daily     Continuous Infusions:  lactated ringers 50 mL/hr Last Rate: 50 mL/hr (09/08/20 2031)     PRN Meds:.•  acetaminophen **OR** acetaminophen **OR** acetaminophen  •  bisacodyl  •  HYDROcodone-acetaminophen  •  magnesium hydroxide  •  magnesium sulfate **OR** magnesium sulfate **OR** magnesium sulfate  •  melatonin  •  Morphine **AND** naloxone  •  nitroglycerin  •  ondansetron **OR** ondansetron  •  potassium chloride **OR** potassium chloride **OR** potassium chloride  •  [COMPLETED] Insert peripheral IV **AND** sodium chloride  •  sodium chloride    Labs:    CBC    Results from last 7 days   Lab Units 09/09/20 0349 09/08/20 0234 09/07/20 0323 09/06/20  1008   WBC 10*3/mm3 7.10 8.10 8.40 12.20*   HEMOGLOBIN g/dL 12.5* 13.1 13.1 15.8   PLATELETS 10*3/mm3 236 246 270 320     BMP Results from last 7 days   Lab Units 09/09/20 0349 09/08/20 0234 09/07/20 0323 09/06/20  1008   SODIUM mmol/L 138 137 140 141   POTASSIUM mmol/L 4.0 3.9 4.3 4.2   CHLORIDE mmol/L 100 101 106 105   CO2 mmol/L 30.0* 28.0 26.0 25.0   BUN   --  17 26* 26*   CREATININE mg/dL 0.97 0.94 1.15 1.16   GLUCOSE mg/dL 101* 114* 133* 156*   MAGNESIUM mg/dL  --   --  2.2  --      Cr Clearance Estimated Creatinine Clearance: 96.7 mL/min (by C-G formula based on SCr of 0.97 mg/dL).  Coag   Results from last 7 days   Lab Units 09/06/20  1008   INR  0.94     HbA1C   Lab Results   Component Value Date    HGBA1C 5.3 09/06/2020     Blood Glucose No results found for: POCGLU  Infection     CMP Results from last 7 days   Lab Units 09/09/20 0349 09/08/20 0234 09/07/20 0323 09/06/20  1008   SODIUM mmol/L 138 137 140 141   POTASSIUM mmol/L 4.0 3.9 4.3 4.2   CHLORIDE mmol/L 100 101 106 105   CO2 mmol/L 30.0* 28.0 26.0 25.0   BUN   --  17 26* 26*   CREATININE mg/dL 0.97 0.94 1.15 1.16   GLUCOSE mg/dL 101* 114* 133* 156*   ALBUMIN g/dL  --  3.30*  --   --    BILIRUBIN mg/dL  --  0.7  --   --    ALK PHOS U/L  --  75  --   --     AST (SGOT) U/L  --  22  --   --    ALT (SGPT) U/L  --  11  --   --      UA      Radiology(recent) No radiology results for the last day   Assessment:    Ventral hernia with obstruction and without gangrene  Status post incisional hernia repair September 6, 2020  SVT (supraventricular tachycardia) (CMS/HCC)  Primary essential hypertension  Ankylosing spondylitis  OANH  Degenerative disc disease cervical spine  Dyslipidemia  BPH with LUTS  Gastroesophageal reflux disease without esophagitis  Diverticulosis    Plan:  Continue present approach//home when we have demonstrated return of bowel function        Mani Aceves MD  09/09/20  07:53

## 2020-09-10 NOTE — PROGRESS NOTES
Case Management Discharge Note      Final Note: Routine discharge home with spouse. Patient denies any need for home health at this time.     Provided Post Acute Provider List?: N/A              Final Discharge Disposition Code: 01 - home or self-care

## 2020-09-22 ENCOUNTER — OFFICE VISIT (OUTPATIENT)
Dept: SURGERY | Facility: CLINIC | Age: 70
End: 2020-09-22

## 2020-09-22 VITALS
SYSTOLIC BLOOD PRESSURE: 131 MMHG | HEART RATE: 81 BPM | DIASTOLIC BLOOD PRESSURE: 81 MMHG | OXYGEN SATURATION: 98 % | WEIGHT: 260 LBS | TEMPERATURE: 97.1 F | HEIGHT: 74 IN | BODY MASS INDEX: 33.37 KG/M2

## 2020-09-22 DIAGNOSIS — K43.6 VENTRAL HERNIA WITH OBSTRUCTION AND WITHOUT GANGRENE: Primary | ICD-10-CM

## 2020-09-22 PROCEDURE — 99024 POSTOP FOLLOW-UP VISIT: CPT | Performed by: SURGERY

## 2020-09-22 NOTE — PROGRESS NOTES
Subjective   Florencio Rangel is a 70 y.o. male.   70-year-old gentleman who is a couple weeks out from exploratory laparotomy reduction of incarcerated ventral hernia and ventral hernia repair with an underlay mesh and primary closure of fascia over the mesh.    He says his recovery has been very uneventful.  He has not needed any pain medication has been tolerating a diet having regular bowel function.  He has had no incisional drainage.  He has had some itching from the bandage but otherwise is doing well.  He has adhered to his lifting restriction and also been wearing his abdominal binder from the morning to the evening but he says he has not been sleeping in the binder    Objective   There were no vitals taken for this visit.  Physical Exam  Constitutional:       Appearance: Normal appearance.   HENT:      Head: Normocephalic and atraumatic.   Cardiovascular:      Rate and Rhythm: Normal rate.   Pulmonary:      Effort: Pulmonary effort is normal.   Abdominal:      Comments: Soft minimally tender to palpation some induration along the fascial closure no evidence of erythema or seroma   Skin:     General: Skin is warm and dry.   Neurological:      General: No focal deficit present.      Mental Status: He is alert and oriented to person, place, and time.         Assessment/Plan   Diagnoses and all orders for this visit:    Ventral hernia with obstruction and without gangrene    Status post exploratory laparotomy with repair of incarcerated ventral hernia with mesh.  His recovery has been going very well.  No issues to discuss today.  Okay to start driving.  Follow-up in 1 month at which point will likely release him from his restrictions.    Yo Montanez MD  9/22/2020  9:22 AM EDT    This note was created using Dragon Voice Recognition software.

## 2020-10-07 ENCOUNTER — OFFICE VISIT (OUTPATIENT)
Dept: CARDIOLOGY | Facility: CLINIC | Age: 70
End: 2020-10-07

## 2020-10-07 VITALS
HEIGHT: 74 IN | DIASTOLIC BLOOD PRESSURE: 80 MMHG | SYSTOLIC BLOOD PRESSURE: 142 MMHG | BODY MASS INDEX: 34.01 KG/M2 | HEART RATE: 72 BPM | WEIGHT: 265 LBS

## 2020-10-07 DIAGNOSIS — I10 ESSENTIAL HYPERTENSION: Primary | Chronic | ICD-10-CM

## 2020-10-07 DIAGNOSIS — I47.1 SVT (SUPRAVENTRICULAR TACHYCARDIA) (HCC): ICD-10-CM

## 2020-10-07 PROCEDURE — 99213 OFFICE O/P EST LOW 20 MIN: CPT | Performed by: INTERNAL MEDICINE

## 2020-10-07 RX ORDER — LOSARTAN POTASSIUM 100 MG/1
100 TABLET ORAL DAILY
Status: SHIPPED | COMMUNITY
Start: 2020-10-07

## 2020-10-07 RX ORDER — ESCITALOPRAM OXALATE 10 MG/1
1 TABLET ORAL DAILY
COMMUNITY
Start: 2020-09-29

## 2020-10-07 NOTE — PROGRESS NOTES
Date of Office Visit: 10/07/2020  Encounter Provider: Dr. Nick Chapman  Place of Service: [facility] CARDIOLOGY [place]  Patient Name: Florencio Rangel  :1950  Mani Aceves MD    Chief Complaint   Patient presents with   • Rapid Heart Rate  Noted complex tachycardia  SVT     hospital follow up /echo   • Hypertension     History of Present Illness:    I am pleased to see Mr. Rangel in my office today as a follow-up.    As you know, patient is 70 years old white gentleman whose past medical history significant for hypertension, SVT, who came today for follow-up.    In 2015, patient was presented to me for symptom of shortness of breath and abnormal EKG.  Patient underwent stress test in which he walked for total of 8 minutes and EKG showed no ST changes.  Patient blood pressure was elevated.  Patient was started on amlodipine 5 mg daily.    Patient lost to follow-up, he was last seen in my office was in 2018 and came today after his recent hospital admission and discharge.  In 2020, patient was admitted at [facility] with symptom of abdominal pain and was found to have ventral hernia with obstruction.  Patient underwent urgent repair for ventral hernia.  Postoperatively, patient developed palpitation and narrow complex tachycardia was noted.  Patient was started on low-dose Lopressor 12.5 mg twice daily.  His blood pressure was low in the office once he was discharged.  Patient has been taken off amlodipine.    Patient brought blood pressure logbook and most of the blood pressures are within desirable range.  I would recommend that blood pressure monitoring should be continued.  Patient denies any chest pain.  Patient is healing well from the recent surgery.  No abdominal pain or diarrhea.  Patient denies any orthopnea, PND, syncope or presyncope.    At this stage, I would recommend that patient should change losartan to 50 mg twice daily.  Discontinue amlodipine.   Continue low-dose beta-blocker.        Past Medical History:   Diagnosis Date   • Arthritis    • Cancer (CMS/HCC)     basil ca on nose   • Elevated cholesterol    • Essential hypertension 9/7/2020   • GERD (gastroesophageal reflux disease)          Past Surgical History:   Procedure Laterality Date   • APPENDECTOMY     • COLONOSCOPY     • ECHO - CONVERTED  2020   • ENDOSCOPY     • VENTRAL/INCISIONAL HERNIA REPAIR N/A 9/6/2020    Procedure: INCISIONAL HERNIA REPAIR;  Surgeon: Yo Montanez MD;  Location: [facility] MAIN OR;  Service: General;  Laterality: N/A;           Current Outpatient Medications:   •  calcium acetate (PHOS BINDER,) 667 MG capsule capsule, Take 1,334 mg by mouth 2 (two) times a day., Disp: , Rfl:   •  cholecalciferol (VITAMIN D3) 10 MCG (400 UNIT) tablet, Take 800 Units by mouth Daily., Disp: , Rfl:   •  escitalopram (LEXAPRO) 10 MG tablet, 1 tablet Daily., Disp: , Rfl:   •  losartan (COZAAR) 100 MG tablet, Take 0.5 tablets by mouth 2 (Two) Times a Day., Disp: , Rfl:   •  metoprolol tartrate (LOPRESSOR) 25 MG tablet, Take 0.5 tablets by mouth 2 (Two) Times a Day for 30 days., Disp: 30 tablet, Rfl: 3  •  Omega-3 Fatty Acids (FISH OIL) 1000 MG capsule capsule, Take 1,000 mg by mouth Daily With Breakfast., Disp: , Rfl:   •  omeprazole (priLOSEC) 20 MG capsule, Take 20 mg by mouth Daily., Disp: , Rfl:   •  polyethylene glycol (MIRALAX) 17 g packet, Take 17 g by mouth 2 (Two) Times a Day As Needed (constipation)., Disp: , Rfl:   •  tamsulosin (FLOMAX) 0.4 MG capsule 24 hr capsule, Take 1 capsule by mouth Daily., Disp: , Rfl:   •  thiamine (VITAMIN B-1) 100 MG tablet, Take 100 mg by mouth Daily., Disp: , Rfl:       Social History     Socioeconomic History   • Marital status:      Spouse name: Not on file   • Number of children: Not on file   • Years of education: Not on file   • Highest education level: Not on file   Tobacco Use   • Smoking status: Former Smoker     Quit date: 9/6/1976     Years  "since quittin.1   • Smokeless tobacco: Never Used   Substance and Sexual Activity   • Alcohol use: Yes     Alcohol/week: 2.0 standard drinks     Types: 2 Cans of beer per week   • Drug use: Never   • Sexual activity: Defer         Review of Systems   Constitution: Negative for chills and fever.   HENT: Negative for ear discharge and nosebleeds.    Eyes: Negative for discharge and redness.   Cardiovascular: Negative for chest pain, orthopnea, palpitations, paroxysmal nocturnal dyspnea and syncope.   Respiratory: Positive for shortness of breath. Negative for cough and wheezing.    Endocrine: Negative for heat intolerance.   Skin: Negative for rash.   Musculoskeletal: Positive for arthritis and joint pain. Negative for myalgias.   Gastrointestinal: Negative for abdominal pain, melena, nausea and vomiting.   Genitourinary: Negative for dysuria and hematuria.   Neurological: Negative for dizziness, light-headedness, numbness and tremors.   Psychiatric/Behavioral: Negative for depression. The patient is not nervous/anxious.        Procedures    Procedures    No orders to display           Objective:    /80   Pulse 72   Ht 188 cm (74.02\")   Wt 120 kg (265 lb)   BMI 34.01 kg/m²         Constitutional:       Appearance: Well-developed.   Eyes:      General: No scleral icterus.        Right eye: No discharge.   HENT:      Head: Normocephalic and atraumatic.   Neck:      Thyroid: No thyromegaly.      Lymphadenopathy: No cervical adenopathy.   Pulmonary:      Effort: Pulmonary effort is normal. No respiratory distress.      Breath sounds: Normal breath sounds. No wheezing. No rales.   Cardiovascular:      Normal rate. Regular rhythm.      No gallop.   Abdominal:      Tenderness: There is no abdominal tenderness.   Skin:     Findings: No erythema or rash.   Neurological:      Mental Status: Alert and oriented to person, place, and time.             Assessment:       Diagnosis Plan   1. Essential hypertension   "   2. SVT (supraventricular tachycardia) (CMS/HCC)              Plan:       I would recommend to change losartan to 50 mg twice daily.  Blood pressure monitoring is recommended.  I would intend to see the patient in 6 months

## 2020-10-20 ENCOUNTER — OFFICE VISIT (OUTPATIENT)
Dept: SURGERY | Facility: CLINIC | Age: 70
End: 2020-10-20

## 2020-10-20 VITALS
BODY MASS INDEX: 33.6 KG/M2 | HEART RATE: 61 BPM | HEIGHT: 74 IN | WEIGHT: 261.8 LBS | DIASTOLIC BLOOD PRESSURE: 91 MMHG | SYSTOLIC BLOOD PRESSURE: 159 MMHG | TEMPERATURE: 97.7 F | RESPIRATION RATE: 18 BRPM | OXYGEN SATURATION: 97 %

## 2020-10-20 DIAGNOSIS — K43.6 VENTRAL HERNIA WITH OBSTRUCTION AND WITHOUT GANGRENE: Primary | ICD-10-CM

## 2020-10-20 PROCEDURE — 99024 POSTOP FOLLOW-UP VISIT: CPT | Performed by: SURGERY

## 2020-10-20 NOTE — PROGRESS NOTES
"Subjective   Florencio Rangel is a 70 y.o. male.   70-year-old gentleman who is about 6 weeks out from exploratory laparotomy reduction of incarcerated hernia ventral hernia repair with mesh.  He says he is doing very well.  No nausea or vomiting no incisional discomfort having regular bowel function.  He had one episode of some mild right lower quadrant discomfort this morning it was very short-lived resolve spontaneously.    Objective   /91 (BP Location: Left arm, Patient Position: Sitting, Cuff Size: Adult)   Pulse 61   Temp 97.7 °F (36.5 °C) (Temporal)   Resp 18   Ht 188 cm (74\")   Wt 119 kg (261 lb 12.8 oz)   SpO2 97%   BMI 33.61 kg/m²   Physical Exam  Constitutional:       Appearance: Normal appearance.   HENT:      Head: Normocephalic and atraumatic.   Cardiovascular:      Rate and Rhythm: Normal rate.   Abdominal:      Comments: Abdomen is soft and appropriately minimally tender incision has healed well without any evidence of erythema.  There is no bulging or ventral incisional defects to suggest persistent or recurrent hernia   Skin:     General: Skin is warm and dry.   Neurological:      Mental Status: He is alert.         Assessment/Plan   Diagnoses and all orders for this visit:    1. Ventral hernia with obstruction and without gangrene (Primary)    6 weeks out from exploratory laparotomy repair of ventral hernia.  Doing well.  Activity as tolerated.  Follow-up as needed.    Yo Montanez MD  10/20/2020  9:11 AM EDT    This note was created using Dragon Voice Recognition software.  "

## 2021-04-06 NOTE — PROGRESS NOTES
Date of Office Visit: 2021  Encounter Provider: Dr. Nick Chapman  Place of Service: [facility] CARDIOLOGY [place]  Patient Name: Florencio Rangel  :1950  Mani Aceves MD    Chief Complaint   Patient presents with   • Hypertension     6 month follow up /echo   • Rapid Heart Rate     History of Present Illness    I am pleased to see Mr. Rangel in my office today as a follow-up.    As you know, patient is 71 years old white gentleman whose past medical history significant for hypertension, SVT, who came today for follow-up.    In 2015, patient was presented to me for symptom of shortness of breath and abnormal EKG.  Patient underwent stress test in which he walked for total of 8 minutes and EKG showed no ST changes.  Patient blood pressure was elevated.  Patient was started on amlodipine 5 mg daily.    In 2020, patient was admitted at [facility] with abdominal pain and was found to have ventral hernia with obstruction.  Patient underwent urgent repair for the hernia.  Postprocedure patient developed narrow complex tachycardia possibly atrial tachycardia versus atrial fibrillation.  It was aborted with low-dose beta-blocker therapy.  Patient was discharged.    On previous visit, patient blood pressure was noted to be low.  He was feeling dizzy and lightheaded.  I advised the patient to discontinue amlodipine.  Patient was advised to continue losartan and beta-blocker.  Since then his blood pressure is much better.  He is not complaining any symptom of dizziness and lightheadedness.  No orthopnea PND no syncope or presyncope.    EKG showed normal sinus rhythm.  Inferior Q waves were noted.    At this stage I am pleased with the patient condition.  Continue current therapy.  I will see the patient in 9 months.  Blood pressure monitoring should be continued          Past Medical History:   Diagnosis Date   • Arthritis    • Cancer (CMS/HCC)     basil ca on nose   • Elevated  cholesterol    • Essential hypertension 9/7/2020   • GERD (gastroesophageal reflux disease)          Past Surgical History:   Procedure Laterality Date   • APPENDECTOMY     • COLONOSCOPY     • ECHO - CONVERTED  2020   • ENDOSCOPY     • VENTRAL/INCISIONAL HERNIA REPAIR N/A 9/6/2020    Procedure: INCISIONAL HERNIA REPAIR;  Surgeon: Yo Montanez MD;  Location: [facility] MAIN OR;  Service: General;  Laterality: N/A;           Current Outpatient Medications:   •  aspirin 81 MG chewable tablet, Chew 81 mg Every Other Day., Disp: , Rfl:   •  calcium acetate (PHOS BINDER,) 667 MG capsule capsule, Take 1,334 mg by mouth 2 (two) times a day., Disp: , Rfl:   •  cholecalciferol (VITAMIN D3) 10 MCG (400 UNIT) tablet, Take 800 Units by mouth Daily., Disp: , Rfl:   •  escitalopram (LEXAPRO) 10 MG tablet, 1 tablet Daily., Disp: , Rfl:   •  losartan (COZAAR) 100 MG tablet, Take 50 mg by mouth Daily., Disp: , Rfl:   •  metoprolol succinate XL (TOPROL-XL) 25 MG 24 hr tablet, Take 25 mg by mouth Daily., Disp: , Rfl:   •  Omega-3 Fatty Acids (FISH OIL) 1000 MG capsule capsule, Take 1,000 mg by mouth Daily With Breakfast., Disp: , Rfl:   •  omeprazole (priLOSEC) 20 MG capsule, Take 20 mg by mouth Daily., Disp: , Rfl:   •  polyethylene glycol (MIRALAX) 17 g packet, Take 17 g by mouth 2 (Two) Times a Day As Needed (constipation)., Disp: , Rfl:   •  sulindac (CLINORIL) 150 MG tablet, Take 150 mg by mouth 2 (Two) Times a Day With Meals., Disp: , Rfl:   •  tamsulosin (FLOMAX) 0.4 MG capsule 24 hr capsule, Take 1 capsule by mouth 2 (Two) Times a Day., Disp: , Rfl:   •  thiamine (VITAMIN B-1) 100 MG tablet, Take 100 mg by mouth Daily., Disp: , Rfl:       Social History     Socioeconomic History   • Marital status:      Spouse name: Not on file   • Number of children: Not on file   • Years of education: Not on file   • Highest education level: Not on file   Tobacco Use   • Smoking status: Former Smoker     Quit date: 9/6/1976     Years  "since quittin.6   • Smokeless tobacco: Never Used   Vaping Use   • Vaping Use: Never used   Substance and Sexual Activity   • Alcohol use: Yes     Alcohol/week: 2.0 standard drinks     Types: 2 Cans of beer per week   • Drug use: Never   • Sexual activity: Defer         Review of Systems   Constitutional: Negative for chills and fever.   HENT: Negative for ear discharge and nosebleeds.    Eyes: Negative for discharge and redness.   Cardiovascular: Negative for chest pain, orthopnea, palpitations, paroxysmal nocturnal dyspnea and syncope.   Respiratory: Positive for shortness of breath. Negative for cough and wheezing.    Endocrine: Negative for heat intolerance.   Skin: Negative for rash.   Musculoskeletal: Negative for arthritis and myalgias.   Gastrointestinal: Negative for abdominal pain, melena, nausea and vomiting.   Genitourinary: Negative for dysuria and hematuria.   Neurological: Negative for dizziness, light-headedness, numbness and tremors.   Psychiatric/Behavioral: Negative for depression. The patient is not nervous/anxious.        Procedures      ECG 12 Lead    Date/Time: 2021 12:18 PM  Performed by: Nick Chapman MD  Authorized by: Nick Chapman MD   Comparison: compared with previous ECG   Similar to previous ECG  Rhythm: sinus rhythm  Q waves: II, III and aVF      Clinical impression: normal ECG            ECG 12 Lead    (Results Pending)           Objective:    /78   Pulse 62   Ht 188 cm (74.02\")   Wt 119 kg (263 lb)   BMI 33.75 kg/m²         Constitutional:       Appearance: Well-developed.   Eyes:      General: No scleral icterus.        Right eye: No discharge.   HENT:      Head: Normocephalic and atraumatic.   Neck:      Thyroid: No thyromegaly.      Lymphadenopathy: No cervical adenopathy.   Pulmonary:      Effort: Pulmonary effort is normal. No respiratory distress.      Breath sounds: Normal breath sounds. No wheezing. No rales.   Cardiovascular:      Normal rate. Regular " rhythm.      No gallop.   Abdominal:      Tenderness: There is no abdominal tenderness.   Skin:     Findings: No erythema or rash.   Neurological:      Mental Status: Alert and oriented to person, place, and time.             Assessment:       Diagnosis Plan   1. SVT (supraventricular tachycardia) (CMS/HCC)  ECG 12 Lead   2. Essential hypertension  ECG 12 Lead            Plan:       MDM:    1.  Hypertension:    He brought his blood pressure logbook and most of the blood pressures are within desirable range.  At this stage patient is stable.  I will continue current treatment.    2.  SVT:    Patient has not had any further episode.  Patient is advised to continue low-dose beta-blocker therapy

## 2021-04-07 ENCOUNTER — OFFICE VISIT (OUTPATIENT)
Dept: CARDIOLOGY | Facility: CLINIC | Age: 71
End: 2021-04-07

## 2021-04-07 VITALS
HEART RATE: 62 BPM | BODY MASS INDEX: 33.75 KG/M2 | HEIGHT: 74 IN | SYSTOLIC BLOOD PRESSURE: 138 MMHG | WEIGHT: 263 LBS | DIASTOLIC BLOOD PRESSURE: 78 MMHG

## 2021-04-07 DIAGNOSIS — I47.1 SVT (SUPRAVENTRICULAR TACHYCARDIA) (HCC): Primary | ICD-10-CM

## 2021-04-07 DIAGNOSIS — I10 ESSENTIAL HYPERTENSION: ICD-10-CM

## 2021-04-07 PROCEDURE — 99214 OFFICE O/P EST MOD 30 MIN: CPT | Performed by: INTERNAL MEDICINE

## 2021-04-07 PROCEDURE — 93000 ELECTROCARDIOGRAM COMPLETE: CPT | Performed by: INTERNAL MEDICINE

## 2021-04-07 RX ORDER — SULINDAC 150 MG/1
150 TABLET ORAL 2 TIMES DAILY WITH MEALS
COMMUNITY
Start: 2021-03-26 | End: 2022-02-10

## 2021-04-07 RX ORDER — ASPIRIN 81 MG/1
81 TABLET, CHEWABLE ORAL EVERY OTHER DAY
COMMUNITY

## 2021-04-07 RX ORDER — METOPROLOL SUCCINATE 25 MG/1
25 TABLET, EXTENDED RELEASE ORAL DAILY
COMMUNITY
End: 2022-02-10

## 2022-02-09 NOTE — PROGRESS NOTES
Date of Office Visit: 02/10/2022  Encounter Provider: Dr. Nick Chapman  Place of Service: [facility] CARDIOLOGY [place]  Patient Name: Florencio Rangel  :1950  Mani Aceves MD    Chief Complaint   Patient presents with   • Rapid Heart Rate     9 month follow up   • Hypertension     History of Present Illness    I am pleased to see Mr. Rangel in my office today as a follow-up.    As you know, patient is 71 years old white gentleman whose past medical history significant for hypertension, SVT, who came today for follow-up.    In 2015, patient was presented to me for symptom of shortness of breath and abnormal EKG.  Patient underwent stress test in which he walked for total of 8 minutes and EKG showed no ST changes.  Patient blood pressure was elevated.  Patient was started on amlodipine 5 mg daily.    In 2020, patient was admitted at [facility] with abdominal pain and was found to have ventral hernia with obstruction.  Patient underwent urgent repair for the hernia.  Postprocedure patient developed narrow complex tachycardia possibly atrial tachycardia versus atrial fibrillation.  It was aborted with low-dose beta-blocker therapy.  Patient was discharged.    Since the previous visit, patient is overall doing well.  His blood pressure readings at home's are all within desirable range.  His blood pressure today is high but all the blood pressure reading at home is within normal limits.  I am pleased with the current treatment.  However patient is bradycardic.  He does complain of dizziness and lightheadedness which appears to be due to vertigo.  No orthopnea PND no анна syncope.    EKG showed sinus bradycardia nonspecific ST changes noted.    At this stage, I would recommend to decrease Toprol-XL to 12.5 mg daily.  I would start meclizine 12.5 mg twice daily.  Continue to monitor the blood pressure and heart rate.  If needed I would discontinue Lopressor altogether.  I suspect  patient has underlying sick sinus syndrome.        Past Medical History:   Diagnosis Date   • Arthritis    • Cancer (HCC)     basil ca on nose   • Elevated cholesterol    • Essential hypertension 9/7/2020   • GERD (gastroesophageal reflux disease)          Past Surgical History:   Procedure Laterality Date   • APPENDECTOMY     • COLONOSCOPY     • ECHO - CONVERTED  2020   • ENDOSCOPY     • VENTRAL/INCISIONAL HERNIA REPAIR N/A 9/6/2020    Procedure: INCISIONAL HERNIA REPAIR;  Surgeon: Yo Montanez MD;  Location: [facility] MAIN OR;  Service: General;  Laterality: N/A;           Current Outpatient Medications:   •  aspirin 81 MG chewable tablet, Chew 81 mg Every Other Day., Disp: , Rfl:   •  calcium acetate (PHOS BINDER,) 667 MG capsule capsule, Take 1,334 mg by mouth 2 (two) times a day., Disp: , Rfl:   •  cholecalciferol (VITAMIN D3) 10 MCG (400 UNIT) tablet, Take 800 Units by mouth Daily., Disp: , Rfl:   •  escitalopram (LEXAPRO) 10 MG tablet, 1 tablet Daily., Disp: , Rfl:   •  losartan (COZAAR) 100 MG tablet, Take 50 mg by mouth Daily., Disp: , Rfl:   •  metoprolol succinate XL (TOPROL-XL) 25 MG 24 hr tablet, Take 0.5 tablets by mouth Daily., Disp: , Rfl:   •  nabumetone (RELAFEN) 750 MG tablet, Take 750 mg by mouth 2 (Two) Times a Day., Disp: , Rfl:   •  Omega-3 Fatty Acids (FISH OIL) 1000 MG capsule capsule, Take 1,000 mg by mouth Daily With Breakfast., Disp: , Rfl:   •  omeprazole (priLOSEC) 20 MG capsule, Take 20 mg by mouth Daily., Disp: , Rfl:   •  polyethylene glycol (MIRALAX) 17 g packet, Take 17 g by mouth 2 (Two) Times a Day As Needed (constipation)., Disp: , Rfl:   •  tamsulosin (FLOMAX) 0.4 MG capsule 24 hr capsule, Take 1 capsule by mouth 2 (Two) Times a Day., Disp: , Rfl:   •  thiamine (VITAMIN B-1) 100 MG tablet, Take 100 mg by mouth Daily., Disp: , Rfl:     Current Facility-Administered Medications:   •  meclizine (ANTIVERT) tablet 12.5 mg, 12.5 mg, Oral, BID, Nick Chapman MD      Social History  "    Socioeconomic History   • Marital status:    Tobacco Use   • Smoking status: Former Smoker     Quit date: 1976     Years since quittin.4   • Smokeless tobacco: Never Used   Vaping Use   • Vaping Use: Never used   Substance and Sexual Activity   • Alcohol use: Yes     Alcohol/week: 2.0 standard drinks     Types: 2 Cans of beer per week   • Drug use: Never   • Sexual activity: Defer         Review of Systems   Constitutional: Negative for chills and fever.   HENT: Negative for ear discharge and nosebleeds.    Eyes: Negative for discharge and redness.   Cardiovascular: Negative for chest pain, orthopnea, palpitations, paroxysmal nocturnal dyspnea and syncope.   Respiratory: Positive for shortness of breath. Negative for cough and wheezing.    Endocrine: Negative for heat intolerance.   Skin: Negative for rash.   Musculoskeletal: Positive for arthritis, back pain and joint pain. Negative for myalgias.   Gastrointestinal: Negative for abdominal pain, melena, nausea and vomiting.   Genitourinary: Negative for dysuria and hematuria.   Neurological: Positive for dizziness. Negative for light-headedness, numbness and tremors.   Psychiatric/Behavioral: Negative for depression. The patient is not nervous/anxious.        Procedures      ECG 12 Lead    Date/Time: 2/10/2022 5:25 PM  Performed by: Nick Chapman MD  Authorized by: Nick Chapman MD   Comparison: compared with previous ECG   Similar to previous ECG  Rhythm: sinus rhythm    Clinical impression: normal ECG            ECG 12 Lead    (Results Pending)           Objective:    /88   Pulse 55   Ht 188 cm (74.02\")   Wt 122 kg (268 lb)   BMI 34.39 kg/m²         Constitutional:       Appearance: Well-developed.   Eyes:      General: No scleral icterus.        Right eye: No discharge.   HENT:      Head: Normocephalic and atraumatic.   Neck:      Thyroid: No thyromegaly.      Lymphadenopathy: No cervical adenopathy.   Pulmonary:      Effort: " Pulmonary effort is normal. No respiratory distress.      Breath sounds: Normal breath sounds. No wheezing. No rales.   Cardiovascular:      Normal rate. Regular rhythm.      No gallop.   Abdominal:      Tenderness: There is no abdominal tenderness.   Skin:     Findings: No erythema or rash.   Neurological:      Mental Status: Alert and oriented to person, place, and time.             Assessment:       Diagnosis Plan   1. Essential hypertension  ECG 12 Lead    meclizine (ANTIVERT) tablet 12.5 mg   2. SVT (supraventricular tachycardia) (HCC)  ECG 12 Lead    meclizine (ANTIVERT) tablet 12.5 mg   3. Vertigo  ECG 12 Lead    meclizine (ANTIVERT) tablet 12.5 mg            Plan:       MDM:    1.  Hypertension:    Blood pressure is high today in my office but all the blood pressure readings at home are within desirable range.  I will continue current treatment    2.  SVT:    Patient has not had any further episode.  I will continue to observe    3.  Sinus bradycardia:    I would recommend to decrease Toprol-XL to 12.5 mg daily    4.  Vertigo:    I would start meclizine 12.5 mg twice daily

## 2022-02-10 ENCOUNTER — OFFICE VISIT (OUTPATIENT)
Dept: CARDIOLOGY | Facility: CLINIC | Age: 72
End: 2022-02-10

## 2022-02-10 VITALS
DIASTOLIC BLOOD PRESSURE: 88 MMHG | BODY MASS INDEX: 34.39 KG/M2 | SYSTOLIC BLOOD PRESSURE: 162 MMHG | WEIGHT: 268 LBS | HEART RATE: 55 BPM | HEIGHT: 74 IN

## 2022-02-10 DIAGNOSIS — I47.1 SVT (SUPRAVENTRICULAR TACHYCARDIA): ICD-10-CM

## 2022-02-10 DIAGNOSIS — R42 VERTIGO: ICD-10-CM

## 2022-02-10 DIAGNOSIS — I10 ESSENTIAL HYPERTENSION: Primary | ICD-10-CM

## 2022-02-10 PROCEDURE — 99214 OFFICE O/P EST MOD 30 MIN: CPT | Performed by: INTERNAL MEDICINE

## 2022-02-10 PROCEDURE — 93000 ELECTROCARDIOGRAM COMPLETE: CPT | Performed by: INTERNAL MEDICINE

## 2022-02-10 RX ORDER — NABUMETONE 750 MG/1
750 TABLET, FILM COATED ORAL 2 TIMES DAILY
COMMUNITY
Start: 2022-01-31 | End: 2023-03-01

## 2022-02-10 RX ORDER — MECLIZINE HYDROCHLORIDE 25 MG/1
25 TABLET ORAL 2 TIMES DAILY PRN
COMMUNITY
Start: 2021-11-09 | End: 2022-02-10

## 2022-02-10 RX ORDER — METOPROLOL SUCCINATE 25 MG/1
12.5 TABLET, EXTENDED RELEASE ORAL DAILY
Status: SHIPPED | COMMUNITY
Start: 2022-02-10

## 2022-02-10 RX ORDER — MECLIZINE HYDROCHLORIDE 25 MG/1
12.5 TABLET ORAL 2 TIMES DAILY
Status: DISCONTINUED | OUTPATIENT
Start: 2022-02-10 | End: 2022-11-10

## 2022-11-09 NOTE — PROGRESS NOTES
Date of Office Visit: 11/10/2022  Encounter Provider: Dr. Nick Chapman  Place of Service: [facility] [place]  Patient Name: Florencio Rangel  :1950  Mani Aceves MD    Chief Complaint   Patient presents with   • Rapid Heart Rate   • Hypertension   • Follow-up     History of Present Illness    I am pleased to see Mr. Rangel in my office today as a follow-up.    As you know, patient is 72 years old white gentleman whose past medical history significant for hypertension, SVT, who came today for follow-up.    In 2015, patient was presented to me for symptom of shortness of breath and abnormal EKG.  Patient underwent stress test in which he walked for total of 8 minutes and EKG showed no ST changes.  Patient blood pressure was elevated.  Patient was started on amlodipine 5 mg daily.    In 2020, patient was admitted at [facility] with abdominal pain and was found to have ventral hernia with obstruction.  Patient underwent urgent repair for the hernia.  Postprocedure patient developed narrow complex tachycardia possibly atrial tachycardia versus atrial fibrillation.  It was aborted with low-dose beta-blocker therapy.  Patient was discharged.    Patient came today and overall doing well.  On previous visit patient was complaining of dizziness and lightheadedness and he was noted to be bradycardic.  I will decrease the dose of metoprolol and added meclizine.  His symptoms resolved.  Patient is doing well.  He is fairly active.  His blood pressure at home are within desirable range.  Patient denies any chest pain or tightness or heaviness.  No orthopnea PND no syncope or presyncope.  No leg edema noted.    EKG showed normal sinus rhythm incomplete right bundle branch block is noted.  Inferior Q waves were present.    At this stage, I would recommend that patient should proceed with current treatment.  No change in the regimen.  Patient is advised to increase aerobic activity  blood pressure monitoring is recommended I will see the patient in 1 year now        Past Medical History:   Diagnosis Date   • Arthritis    • Cancer (HCC)     basil ca on nose   • Elevated cholesterol    • Essential hypertension 9/7/2020   • GERD (gastroesophageal reflux disease)          Past Surgical History:   Procedure Laterality Date   • APPENDECTOMY     • COLONOSCOPY     • ECHO - CONVERTED  2020   • ENDOSCOPY     • VENTRAL/INCISIONAL HERNIA REPAIR N/A 9/6/2020    Procedure: INCISIONAL HERNIA REPAIR;  Surgeon: Yo Montanez MD;  Location: [facility] OR;  Service: General;  Laterality: N/A;           Current Outpatient Medications:   •  aspirin 81 MG chewable tablet, Chew 81 mg Every Other Day., Disp: , Rfl:   •  calcium acetate (PHOS BINDER,) 667 MG capsule capsule, Take 1,334 mg by mouth 2 (two) times a day., Disp: , Rfl:   •  cholecalciferol (VITAMIN D3) 10 MCG (400 UNIT) tablet, Take 800 Units by mouth Daily., Disp: , Rfl:   •  escitalopram (LEXAPRO) 10 MG tablet, 1 tablet Daily., Disp: , Rfl:   •  losartan (COZAAR) 100 MG tablet, Take 1 tablet by mouth Daily., Disp: , Rfl:   •  metoprolol succinate XL (TOPROL-XL) 25 MG 24 hr tablet, Take 0.5 tablets by mouth Daily., Disp: , Rfl:   •  nabumetone (RELAFEN) 750 MG tablet, Take 750 mg by mouth 2 (Two) Times a Day., Disp: , Rfl:   •  Omega-3 Fatty Acids (FISH OIL) 1000 MG capsule capsule, Take 1,000 mg by mouth Daily With Breakfast., Disp: , Rfl:   •  omeprazole (priLOSEC) 20 MG capsule, Take 20 mg by mouth Daily., Disp: , Rfl:   •  tamsulosin (FLOMAX) 0.4 MG capsule 24 hr capsule, Take 1 capsule by mouth 2 (Two) Times a Day., Disp: , Rfl:   •  thiamine (VITAMIN B-1) 100 MG tablet, Take 100 mg by mouth Daily., Disp: , Rfl:   No current facility-administered medications for this visit.      Social History     Socioeconomic History   • Marital status:    Tobacco Use   • Smoking status: Former     Types: Cigarettes     Quit date: 9/6/1976     Years  "since quittin.2   • Smokeless tobacco: Never   Vaping Use   • Vaping Use: Never used   Substance and Sexual Activity   • Alcohol use: Yes     Alcohol/week: 2.0 standard drinks     Types: 2 Cans of beer per week   • Drug use: Never   • Sexual activity: Defer         Review of Systems   Constitutional: Negative for chills and fever.   HENT: Negative for ear discharge and nosebleeds.    Eyes: Negative for discharge and redness.   Cardiovascular: Negative for chest pain, orthopnea, palpitations, paroxysmal nocturnal dyspnea and syncope.   Respiratory: Negative for cough, shortness of breath and wheezing.    Endocrine: Negative for heat intolerance.   Skin: Negative for rash.   Musculoskeletal: Positive for arthritis and joint pain. Negative for myalgias.   Gastrointestinal: Negative for abdominal pain, melena, nausea and vomiting.   Genitourinary: Negative for dysuria and hematuria.   Neurological: Negative for dizziness, light-headedness, numbness and tremors.   Psychiatric/Behavioral: Negative for depression. The patient is not nervous/anxious.        Procedures      ECG 12 Lead    Date/Time: 11/10/2022 2:33 PM  Performed by: Nick Chapman MD  Authorized by: Nick Chapman MD   Comparison: compared with previous ECG   Similar to previous ECG  Rhythm: sinus rhythm    Clinical impression: normal ECG            ECG 12 Lead    (Results Pending)           Objective:    /78 (BP Location: Right arm, Patient Position: Sitting, Cuff Size: Large Adult)   Pulse 64   Ht 188 cm (74.02\")   Wt 125 kg (275 lb)   SpO2 98%   BMI 35.29 kg/m²         Constitutional:       Appearance: Well-developed.   Eyes:      General: No scleral icterus.        Right eye: No discharge.   HENT:      Head: Normocephalic and atraumatic.   Neck:      Thyroid: No thyromegaly.      Lymphadenopathy: No cervical adenopathy.   Pulmonary:      Effort: Pulmonary effort is normal. No respiratory distress.      Breath sounds: Normal breath sounds. " No wheezing. No rales.   Cardiovascular:      Normal rate. Regular rhythm.      No gallop.   Abdominal:      Tenderness: There is no abdominal tenderness.   Skin:     Findings: No erythema or rash.   Neurological:      Mental Status: Alert and oriented to person, place, and time.             Assessment:       Diagnosis Plan   1. SVT (supraventricular tachycardia) (HCC)  ECG 12 Lead      2. Essential hypertension  ECG 12 Lead               Plan:       MDM:    1.  Hypertension:    Blood pressure is controlled current treatment with losartan and metoprolol will be continued    2.  Atrial tachycardia:    Patient has not had any further recurrence of arrhythmia.  Low-dose beta-blocker would be continued    3.  Palpitation:    His symptoms are contained.

## 2022-11-10 ENCOUNTER — OFFICE VISIT (OUTPATIENT)
Dept: CARDIOLOGY | Facility: CLINIC | Age: 72
End: 2022-11-10

## 2022-11-10 VITALS
HEIGHT: 74 IN | DIASTOLIC BLOOD PRESSURE: 78 MMHG | BODY MASS INDEX: 35.29 KG/M2 | OXYGEN SATURATION: 98 % | HEART RATE: 64 BPM | SYSTOLIC BLOOD PRESSURE: 134 MMHG | WEIGHT: 275 LBS

## 2022-11-10 DIAGNOSIS — I10 ESSENTIAL HYPERTENSION: ICD-10-CM

## 2022-11-10 DIAGNOSIS — I47.1 SVT (SUPRAVENTRICULAR TACHYCARDIA): Primary | ICD-10-CM

## 2022-11-10 PROCEDURE — 93000 ELECTROCARDIOGRAM COMPLETE: CPT | Performed by: INTERNAL MEDICINE

## 2022-11-10 PROCEDURE — 99214 OFFICE O/P EST MOD 30 MIN: CPT | Performed by: INTERNAL MEDICINE

## 2023-03-01 ENCOUNTER — OFFICE VISIT (OUTPATIENT)
Dept: NEUROSURGERY | Facility: CLINIC | Age: 73
End: 2023-03-01
Payer: MEDICARE

## 2023-03-01 VITALS
SYSTOLIC BLOOD PRESSURE: 146 MMHG | HEART RATE: 71 BPM | BODY MASS INDEX: 35.7 KG/M2 | WEIGHT: 278.2 LBS | OXYGEN SATURATION: 97 % | DIASTOLIC BLOOD PRESSURE: 79 MMHG | HEIGHT: 74 IN

## 2023-03-01 DIAGNOSIS — M45.9 ANKYLOSING SPONDYLITIS, UNSPECIFIED SITE OF SPINE: Primary | ICD-10-CM

## 2023-03-01 DIAGNOSIS — M54.2 NECK PAIN OF OVER 3 MONTHS DURATION: ICD-10-CM

## 2023-03-01 PROCEDURE — 99203 OFFICE O/P NEW LOW 30 MIN: CPT

## 2023-06-07 ENCOUNTER — TRANSCRIBE ORDERS (OUTPATIENT)
Dept: PHYSICAL THERAPY | Facility: CLINIC | Age: 73
End: 2023-06-07
Payer: MEDICARE

## 2023-06-07 DIAGNOSIS — R26.9 GAIT ABNORMALITY: Primary | ICD-10-CM

## 2023-06-23 ENCOUNTER — TELEPHONE (OUTPATIENT)
Dept: CARDIOLOGY | Facility: CLINIC | Age: 73
End: 2023-06-23

## 2023-06-23 NOTE — TELEPHONE ENCOUNTER
Caller: Florencio Rangel    Relationship to patient: Self    Best call back number: [phone]    Patient is needing: PATIENT STATED THAT HE IS HAVING AN EPIDURAL PROCEDURE AND HE NEEDS TO BE ADVISED ON THE ASPIRIN 81 MG BEFORE THEY WILL DO THE PROCEDURE. PLEASE CONTACT PATIENT TO ADVISE. THANK YOU.

## 2023-08-21 ENCOUNTER — TELEPHONE (OUTPATIENT)
Dept: CARDIOLOGY | Facility: CLINIC | Age: 73
End: 2023-08-21
Payer: MEDICARE

## 2023-08-21 NOTE — TELEPHONE ENCOUNTER
Hub staff attempted to follow warm transfer process and was unsuccessful     Caller: DAVID AT [facility]    Relationship to patient:     Best call back number: [phone]    Patient is needing: DR REEDER OFFICE AND [facility] IS REQUESTING PATIENT BE SEEN FOR CARDIAC CLEARANCE FOR EPIDURAL INJECTIONS.

## 2023-08-31 RX ORDER — MECLIZINE HYDROCHLORIDE 25 MG/1
1 TABLET ORAL 2 TIMES DAILY PRN
COMMUNITY
End: 2023-09-08

## 2023-08-31 RX ORDER — NABUMETONE 750 MG/1
750 TABLET, FILM COATED ORAL 2 TIMES DAILY
COMMUNITY

## 2023-08-31 RX ORDER — ATORVASTATIN CALCIUM 40 MG/1
40 TABLET, FILM COATED ORAL DAILY
COMMUNITY

## 2023-09-08 ENCOUNTER — OFFICE VISIT (OUTPATIENT)
Dept: CARDIOLOGY | Facility: CLINIC | Age: 73
End: 2023-09-08
Payer: MEDICARE

## 2023-09-08 VITALS
DIASTOLIC BLOOD PRESSURE: 83 MMHG | OXYGEN SATURATION: 97 % | BODY MASS INDEX: 34.91 KG/M2 | SYSTOLIC BLOOD PRESSURE: 124 MMHG | WEIGHT: 272 LBS | RESPIRATION RATE: 18 BRPM | HEART RATE: 86 BPM | HEIGHT: 74 IN

## 2023-09-08 DIAGNOSIS — Z01.810 PRE-OPERATIVE CARDIOVASCULAR EXAMINATION: Primary | ICD-10-CM

## 2023-09-08 DIAGNOSIS — I10 ESSENTIAL HYPERTENSION: ICD-10-CM

## 2023-09-08 DIAGNOSIS — I47.1 SVT (SUPRAVENTRICULAR TACHYCARDIA): ICD-10-CM

## 2023-09-08 PROCEDURE — 3074F SYST BP LT 130 MM HG: CPT | Performed by: NURSE PRACTITIONER

## 2023-09-08 PROCEDURE — 99214 OFFICE O/P EST MOD 30 MIN: CPT | Performed by: NURSE PRACTITIONER

## 2023-09-08 PROCEDURE — 3079F DIAST BP 80-89 MM HG: CPT | Performed by: NURSE PRACTITIONER

## 2023-09-08 PROCEDURE — 93000 ELECTROCARDIOGRAM COMPLETE: CPT | Performed by: NURSE PRACTITIONER

## 2023-09-08 NOTE — PROGRESS NOTES
Cardiology Office Follow Up Visit      Primary Care Provider:  Mani Aceves MD    Reason for f/u:     Pre operative risk assessment       Subjective     CC:    Pre operative risk assessment     Hypertension  Pertinent negatives include no chest pain, headaches, malaise/fatigue, orthopnea, palpitations, PND or shortness of breath.      As you know, patient is 73 year old white gentleman who is a patient of Dr. Chapman. Past medical history significant for hypertension, SVT, who came today for follow-up.     In November 2015, patient was presented to me for symptom of shortness of breath and abnormal EKG.  Patient underwent stress test in which he walked for total of 8 minutes and EKG showed no ST changes.  Patient blood pressure was elevated.  Patient was started on amlodipine 5 mg daily.     In September 2020, patient was admitted at [facility] with abdominal pain and was found to have ventral hernia with obstruction.  Patient underwent urgent repair for the hernia.  Postprocedure patient developed narrow complex tachycardia possibly atrial tachycardia versus atrial fibrillation.  It was aborted with low-dose beta-blocker therapy.  Patient was discharged.     Patient came today and overall doing well.  On previous visit patient was complaining of dizziness and lightheadedness and he was noted to be bradycardic.  I will decrease the dose of metoprolol and added meclizine.  His symptoms resolved.  Patient is doing well.  He is fairly active.  His blood pressure at home are within desirable range.  Patient denies any chest pain or tightness or heaviness.  No orthopnea PND no syncope or presyncope.  No leg edema noted.     EKG showed normal sinus rhythm incomplete right bundle branch block is noted.  Inferior Q waves were present.     Mr. Rangel presents today for pre operative risk assessment prior to epidural. He reports no change in activity. He denies chest pain or exertional symptoms. No signs of heart  failure, no palpitations or irregularity.         ASSESSMENT/PLAN:      Diagnoses and all orders for this visit:    1. Pre-operative cardiovascular examination (Primary)  Comments:  epidural for c spine issues    2. SVT (supraventricular tachycardia)    3. Essential hypertension        MEDICAL DECISION MAKING:  Mr. Rangel presents today for follow up and for pre operative risk assessment prior to epidural for cervical spine issues. He is at an acceptable risk for non cardiac non vascular procedure. No additional testing warranted. Routine risk reducing measures should be taken. Continue current medical therapy. We will see him at his next scheduled appointment.           Past Medical History:   Diagnosis Date    Arthritis     Cancer     basil ca on nose    Elevated cholesterol     Essential hypertension 09/07/2020    GERD (gastroesophageal reflux disease)        Past Surgical History:   Procedure Laterality Date    APPENDECTOMY      COLONOSCOPY      ECHO - CONVERTED  2020    ENDOSCOPY      SPINAL FUSION      Starting in neck.    VENTRAL/INCISIONAL HERNIA REPAIR N/A 09/06/2020    Procedure: INCISIONAL HERNIA REPAIR;  Surgeon: Yo Montanez MD;  Location: [facility] MAIN OR;  Service: General;  Laterality: N/A;         Current Outpatient Medications:     aspirin 81 MG chewable tablet, Chew 1 tablet Every Other Day., Disp: , Rfl:     atorvastatin (LIPITOR) 40 MG tablet, Take 1 tablet by mouth Daily., Disp: , Rfl:     calcium acetate (PHOS BINDER,) 667 MG capsule capsule, Take 2 capsules by mouth 2 (two) times a day., Disp: , Rfl:     cholecalciferol (VITAMIN D3) 10 MCG (400 UNIT) tablet, Take 2 tablets by mouth Daily., Disp: , Rfl:     escitalopram (LEXAPRO) 10 MG tablet, 1 tablet Daily., Disp: , Rfl:     losartan (COZAAR) 100 MG tablet, Take 1 tablet by mouth Daily., Disp: , Rfl:     metoprolol succinate XL (TOPROL-XL) 25 MG 24 hr tablet, Take 0.5 tablets by mouth Daily., Disp: , Rfl:     nabumetone (RELAFEN) 750 MG  tablet, Take 1 tablet by mouth 2 (Two) Times a Day., Disp: , Rfl:     Omega-3 Fatty Acids (FISH OIL) 1000 MG capsule capsule, Take 1 capsule by mouth Daily With Breakfast., Disp: , Rfl:     omeprazole (priLOSEC) 20 MG capsule, Take 1 capsule by mouth Daily., Disp: , Rfl:     tamsulosin (FLOMAX) 0.4 MG capsule 24 hr capsule, Take 1 capsule by mouth 2 (Two) Times a Day., Disp: , Rfl:     thiamine (VITAMIN B-1) 100 MG tablet, Take 1 tablet by mouth Daily., Disp: , Rfl:     Social History     Socioeconomic History    Marital status:    Tobacco Use    Smoking status: Former     Packs/day: 1.00     Years: 47.00     Pack years: 47.00     Types: Cigarettes     Start date: 3/6/1966     Quit date: 1976     Years since quittin.0    Smokeless tobacco: Never    Tobacco comments:     Stop & start many times   Vaping Use    Vaping Use: Never used   Substance and Sexual Activity    Alcohol use: Yes     Alcohol/week: 2.0 standard drinks     Types: 2 Cans of beer per week    Drug use: Never    Sexual activity: Not Currently     Partners: Female     Comment: Too old       Family History   Problem Relation Age of Onset    Heart disease Father          of heart failurej    Heart failure Father     Cancer Brother         Bladder    Stroke Mother          from stroke     Had a pace maker       The following portions of the patient's history were reviewed and updated as appropriate: allergies, current medications, past family history, past medical history, past social history, past surgical history and problem list.    Review of Systems   Constitutional: Negative for chills, diaphoresis and malaise/fatigue.   Cardiovascular:  Negative for chest pain, dyspnea on exertion, irregular heartbeat, leg swelling, near-syncope, orthopnea, palpitations, paroxysmal nocturnal dyspnea and syncope.   Respiratory:  Negative for cough, shortness of breath, sleep disturbances due to breathing and sputum production.   "  Musculoskeletal:  Positive for arthritis and back pain.   Gastrointestinal:  Negative for change in bowel habit.   Genitourinary:  Negative for urgency.   Neurological:  Negative for dizziness and headaches.   Psychiatric/Behavioral:  Negative for altered mental status.      Pertinent items are noted in HPI, all other systems reviewed and negative    /83 (BP Location: Left arm, Patient Position: Sitting, Cuff Size: Large Adult)   Pulse 86   Resp 18   Ht 188 cm (74\")   Wt 123 kg (272 lb)   SpO2 97%   BMI 34.92 kg/m² .  Objective     Constitutional:       Appearance: Not in distress.   Neck:      Vascular: JVD normal.   Pulmonary:      Effort: Pulmonary effort is normal.      Breath sounds: Normal breath sounds.   Cardiovascular:      Normal rate. Regular rhythm.   Pulses:     Intact distal pulses.   Edema:     Peripheral edema absent.   Abdominal:      General: Bowel sounds are normal.      Palpations: Abdomen is soft.   Musculoskeletal: Normal range of motion. Skin:     General: Skin is warm and dry.   Neurological:      General: No focal deficit present.      Mental Status: Oriented to person, place and time.           ECG 12 Lead    Date/Time: 9/8/2023 11:21 AM  Performed by: Meenakshi Shankar APRN  Authorized by: Meenakshi Shankar APRN   Comparison: not compared with previous ECG   Previous ECG: no previous ECG available  Rhythm: sinus rhythm  Rate: normal  BPM: 86  Conduction: incomplete right bundle branch block  Q waves: II, III and aVF    QRS axis: indeterminate        EKG ordered by and reviewed by me in office               "

## 2024-08-26 ENCOUNTER — TRANSCRIBE ORDERS (OUTPATIENT)
Dept: ADMINISTRATIVE | Facility: HOSPITAL | Age: 74
End: 2024-08-26
Payer: MEDICARE

## 2024-08-26 ENCOUNTER — HOSPITAL ENCOUNTER (OUTPATIENT)
Dept: CT IMAGING | Facility: HOSPITAL | Age: 74
Discharge: HOME | End: 2024-08-26
Admitting: NURSE PRACTITIONER
Payer: MEDICARE

## 2024-08-26 DIAGNOSIS — S10.83XA CONTUSION OF OTHER SPECIFIED PART OF NECK, INITIAL ENCOUNTER: ICD-10-CM

## 2024-08-26 DIAGNOSIS — S10.83XA CONTUSION OF OTHER SPECIFIED PART OF NECK, INITIAL ENCOUNTER: Primary | ICD-10-CM

## 2024-08-26 PROCEDURE — 72125 CT NECK SPINE W/O DYE: CPT

## 2025-05-21 ENCOUNTER — TRANSCRIBE ORDERS (OUTPATIENT)
Dept: PHYSICAL THERAPY | Facility: CLINIC | Age: 75
End: 2025-05-21
Payer: MEDICARE

## 2025-05-21 DIAGNOSIS — M25.611 STIFFNESS OF SHOULDER JOINT, RIGHT: Primary | ICD-10-CM

## (undated) DEVICE — GLV SURG BIOGEL LTX PF 7

## (undated) DEVICE — SUT PDS 0 CT 36IN DYED Z358T

## (undated) DEVICE — PK MAJ LAPAROTOMY 50

## (undated) DEVICE — UNDERGLV SURG BIOGEL INDICATOR LTX PF 7

## (undated) DEVICE — SUT PROLN 0 CT1 18IN C821G

## (undated) DEVICE — BINDER: Brand: DEROYAL

## (undated) DEVICE — ADHS LIQ MASTISOL 2/3ML

## (undated) DEVICE — UNDYED BRAIDED (POLYGLACTIN 910), SYNTHETIC ABSORBABLE SUTURE: Brand: COATED VICRYL

## (undated) DEVICE — 3M™ STERI-STRIP™ REINFORCED ADHESIVE SKIN CLOSURES, R1547, 1/2 IN X 4 IN (12 MM X 100 MM), 6 STRIPS/ENVELOPE: Brand: 3M™ STERI-STRIP™

## (undated) DEVICE — PENCL EVAC ULTRAVAC SMOKE W/BLD

## (undated) DEVICE — KT SURG TURNOVER 050

## (undated) DEVICE — 3M™ IOBAN™ 2 ANTIMICROBIAL INCISE DRAPE 6650EZ: Brand: IOBAN™ 2

## (undated) DEVICE — SUT PERMAHAND SILK 3/0 SH1 18IN

## (undated) DEVICE — SPNG GZ 2S 2X2 8PLY STRL PK/2

## (undated) DEVICE — SUT VIC 3/0 SH 27IN J416H

## (undated) DEVICE — CUFF SCD HEMOFORCE SEQ CALF STD MD

## (undated) DEVICE — DRSNG SURESITE WNDW 4X4.5

## (undated) DEVICE — SOL IRRIG H2O 1000ML STRL